# Patient Record
Sex: FEMALE | Race: WHITE | NOT HISPANIC OR LATINO | Employment: OTHER | URBAN - METROPOLITAN AREA
[De-identification: names, ages, dates, MRNs, and addresses within clinical notes are randomized per-mention and may not be internally consistent; named-entity substitution may affect disease eponyms.]

---

## 2017-11-08 ENCOUNTER — ALLSCRIPTS OFFICE VISIT (OUTPATIENT)
Dept: OTHER | Facility: OTHER | Age: 62
End: 2017-11-08

## 2017-11-08 DIAGNOSIS — Z01.419 ENCOUNTER FOR GYNECOLOGICAL EXAMINATION WITHOUT ABNORMAL FINDING: ICD-10-CM

## 2017-11-08 PROCEDURE — 87624 HPV HI-RISK TYP POOLED RSLT: CPT | Performed by: OBSTETRICS & GYNECOLOGY

## 2017-11-08 PROCEDURE — G0145 SCR C/V CYTO,THINLAYER,RESCR: HCPCS | Performed by: OBSTETRICS & GYNECOLOGY

## 2017-11-09 ENCOUNTER — LAB REQUISITION (OUTPATIENT)
Dept: LAB | Facility: HOSPITAL | Age: 62
End: 2017-11-09
Payer: COMMERCIAL

## 2017-11-09 DIAGNOSIS — Z01.419 ENCOUNTER FOR GYNECOLOGICAL EXAMINATION WITHOUT ABNORMAL FINDING: ICD-10-CM

## 2017-11-09 NOTE — PROGRESS NOTES
Assessment    1  Atrophy of vagina (627 3) (N95 2)   2  Dense breast (793 82) (R92 2)   3  Leiomyoma of uterus (218 9) (D25 9)   4  Encounter for routine gynecological examination with Papanicolaou smear of cervix (V72 31,V76 2) (Z01 419)   5  Encounter for screening mammogram for malignant neoplasm of breast (V76 12) (Z12 31)    Plan  Encounter for routine gynecological examination with Papanicolaou smear of cervix    · Follow-up visit in 1 year Evaluation and Treatment  Follow-up  Status: Hold For -Scheduling  Requested for: [id number]   Ordered;Encounter for routine gynecological examination with Papanicolaou smear of cervix; Ordered By: Noa Jean Performed:  Due: [id number]  Encounter for screening mammogram for malignant neoplasm of breast    · MAMMO SCREENING BILATERAL W 3D & CAD; Status:Hold For - Scheduling; Requestedfor:[id number]; Perform:[facility]; MYS:[id number];HJJZTWT;TJV screening mammogram for malignant neoplasm of breast; Ordered By:Cecy Leung;  Leiomyoma of uterus    · Follow-up visit in 3 weeks Evaluation and Treatment  Follow-up  Status: Hold For -Scheduling  Requested for: 84QJO7597   Ordered;Leiomyoma of uterus; Ordered By: Noa Jean Performed:  Due: 22NLP6801    Discussion/Summary    1  Yearly exam-Pap smear done with HPV testing with patient consent, self-breast awareness reviewed, calcium/vitamin-D recommendations discussed, mammogram request given, colonoscopy as per specialisthistory of of postmenopausal bleeding-patient is status post endometrial biopsy x2, with benign findings  She denies any further postmenopausal bleeding and she will call with any such symptoms  Increased breast density/ Mother with bilateral breast cancer-her mother was diagnosed at age 67/80 respectively with the breast cancers  Given this history, she was previously referred to [facility] for possible evaluation  She has had ABUS done June 2016   She will continue screening with 3D mammogramsUterine myomas-ultrasound demonstrates all myomas are smaller, measuring 1 5, 1 2, and 1 2  She is aware that this is likely related to hormonal withdrawal with menopause  She inquired about continuing yearly ultrasound given intermittent pelvic twinges  She will return in the next few weeks for ultrasound and office visit with me  History of hairy cell leukemia/Neutropenia-the patient will followup with hematology, Dr Isra Payton oncologist at [facility]  She stopped Celexa previously secondary to worsening neutropenia  She will continue treatment as per specialistIntermittent pelvic discomfortâ still notes occasionally  She will return for ultrasound in the near futureAtrophic vagina-patient does use lubrication with adequate results  She will call or return with worsening symptoms  Colon cancer screeningâpatient is due for colonoShe will follow-up in the next few weeks for ultrasound and office visit with me  Otherwise, follow-up 1 year for yearly exam or as needed  The patient has the current Goals: Reviewed  The patent has the current Barriers: None  Patient is able to Self-Care  PATIENT EDUCATION RECORD She was given the following educational materials: Calcium/vitamin-D sheet, vaginal lubrication sheet   Chief Complaint    1  Visit For: Preventive General Multisystem Exam    History of Present Illness  HPI: The patient was seen today for yearly exam  Please see assessment plan for details  Review of Systems   Constitutional: No fever, no chills, feels well, no tiredness, no recent weight gain or loss  ENT: no ear ache, no loss of hearing, no nosebleeds or nasal discharge, no sore throat or hoarseness  Cardiovascular: no complaints of slow or fast heart rate, no chest pain, no palpitations, no leg claudication or lower extremity edema  Respiratory: no complaints of shortness of breath, no wheezing, no dyspnea on exertion, no orthopnea or PND    Breasts: no complaints of breast pain, breast lump or nipple discharge  Gastrointestinal: no complaints of abdominal pain, no constipation, no nausea or diarrhea, no vomiting, no bloody stools  Genitourinary: no complaints of dysuria, no incontinence, no pelvic pain, no dysmenorrhea, no vaginal discharge or abnormal vaginal bleeding  Musculoskeletal: no complaints of arthralgia, no myalgia, no joint swelling or stiffness, no limb pain or swelling  Integumentary: no complaints of skin rash or lesion, no itching or dry skin, no skin wounds  Neurological: no complaints of headache, no confusion, no numbness or tingling, no dizziness or fainting  ROS reviewed  Active Problems    1  Anxiety disorder (300 00) (F41 9)   2  Atrophy of vagina (627 3) (N95 2)   3  Dense breast (793 82) (R92 2)   4  Encounter for routine gynecological examination (V72 31) (Z01 419)   5  Encounter for screening mammogram for malignant neoplasm of breast (V76 12) (Z12 31)   6  Hairy cell leukemia (202 40) (C91 40)   7  History of self breast exam   8  Leiomyoma of uterus (218 9) (D25 9)   9  Leukopenia (288 50) (D72 819)    Past Medical History   · History of Basal cell carcinoma of left thigh (173 71) (C44 719)   · History of Endometriosis (617 9) (N80 9)   · History of Female pelvic pain (625 9) (R10 2)   · History of  1 (V22 0) (Z34 00)   · History of abdominal pain (V13 89) (X07 597)   · History of miscarriage (V13 29) (Z87 59)   · History of uterine leiomyoma (V13 29) (Z86 018)   · History of Ovarian cyst (620 2) (N83 20)   · History of Post-menopausal bleeding (627 1) (N95 0)    The active problems and past medical history were reviewed and updated today  Surgical History     · History of Dilation And Curettage   · History of Hysteroscopy   · History of Laparoscopic Excision Left Ovary Cyst (___ Cm)   · History of Laparoscopic Excision Right Ovary Cyst (___ Cm)    The surgical history was reviewed and updated today         Family History  Mother    · Family history of Breast Cancer (V16 3)  Father    · Family history of Coronary artery disease  Maternal Grandfather    · Family history of malignant neoplasm of stomach (V16 0) (Z80 0)  Paternal Aunt    · Family history of malignant neoplasm of stomach (V16 0) (Z80 0)  Maternal Uncle    · Family history of malignant neoplasm of prostate (I00 25) (Z80 45)    The family history was reviewed and updated today  Social History     · Being A Social Drinker   · Caffeine Use   · Denied: History of    · Marital History - Currently    · Never A Smoker   · No drug use   · [employer]   · Uses Safety Equipment - Seatbelts  The social history was reviewed and updated today  The social history was reviewed and is unchanged  Current Meds   1  Allegra 30 MG TABS; Therapy: (Recorded:2016) to Recorded   2  Lexapro 10 MG Oral Tablet; Therapy: (Recorded:2016) to Recorded   3  Vitamin D 78506 UNIT CAPS; Therapy: (Recorded:2016) to Recorded    Allergies  1  No Known Drug Allergies    Vitals   Recorded: 67NWU0811 74:88YY   Systolic 216, LUE, Sitting   Diastolic 86, LUE, Sitting   Height 5 ft 2 in   Weight 145 lb 8 oz   BMI Calculated 26 61   BSA Calculated 1 67   LMP POSTMENOPAUSAL       Physical Exam   Constitutional  General appearance: No acute distress, well appearing and well nourished  Neck  Neck: Normal, supple, trachea midline, no masses  Thyroid: Normal, no thyromegaly  Genitourinary  External genitalia: Normal and no lesions appreciated  Vagina: Normal, no lesions or dryness appreciated  -- Slightly atrophic, without lesions or discharge  Urethra: Normal    Urethral meatus: Normal    Bladder: Normal, soft, non-tender and no prolapse or masses appreciated  Cervix: Normal, no palpable masses  -- Mildly atrophic, without lesions or discharge or cervicitis  No Cervical motion tenderness    Uterus: Normal, non-tender, not enlarged, and no palpable masses  -- Mid position, normal size, nontender, without mass  Adnexa/parametria: Normal, non-tender and no fullness or masses appreciated  Anus, perineum, and rectum: Normal sphincter tone, no masses, and no prolapse  Chest  Breasts: Normal and no dimpling or skin changes noted  Abdomen  Abdomen: Normal, non-tender, and no organomegaly noted  Liver and spleen: No hepatomegaly or splenomegaly  Examination for hernias: No hernias appreciated  Lymphatic  Palpation of lymph nodes in neck, axillae, groin and/or other locations: No lymphadenopathy or masses noted  Skin  Skin and subcutaneous tissue: Normal skin turgor and no rashes     Palpation of skin and subcutaneous tissue: Normal    Psychiatric  Orientation to person, place, and time: Normal    Mood and affect: Normal        Signatures   Electronically signed by : SURINDER Bryant ; Nov 8 2017  9:01AM EST                       (Author)

## 2017-11-13 LAB — HPV RRNA GENITAL QL NAA+PROBE: NORMAL

## 2017-11-15 LAB
LAB AP GYN PRIMARY INTERPRETATION: NORMAL
Lab: NORMAL

## 2017-11-16 ENCOUNTER — GENERIC CONVERSION - ENCOUNTER (OUTPATIENT)
Dept: OTHER | Facility: OTHER | Age: 62
End: 2017-11-16

## 2017-11-28 ENCOUNTER — GENERIC CONVERSION - ENCOUNTER (OUTPATIENT)
Dept: OTHER | Facility: OTHER | Age: 62
End: 2017-11-28

## 2017-12-11 ENCOUNTER — GENERIC CONVERSION - ENCOUNTER (OUTPATIENT)
Dept: OBGYN CLINIC | Facility: CLINIC | Age: 62
End: 2017-12-11

## 2017-12-14 ENCOUNTER — GENERIC CONVERSION - ENCOUNTER (OUTPATIENT)
Dept: OTHER | Facility: OTHER | Age: 62
End: 2017-12-14

## 2017-12-18 ENCOUNTER — GENERIC CONVERSION - ENCOUNTER (OUTPATIENT)
Dept: OTHER | Facility: OTHER | Age: 62
End: 2017-12-18

## 2017-12-18 DIAGNOSIS — R92.2 INCONCLUSIVE MAMMOGRAM: ICD-10-CM

## 2018-01-11 NOTE — MISCELLANEOUS
Message   Recorded as Task   Date: 12/08/2016 07:55 AM, Created By: Saunders Boeck   Task Name: Go to Note   Assigned To: Jose Leung   Regarding Patient: Adrienne Little, Status: Active   CommentConception Ryan - 08 Dec 2016 7:55 AM     TASK CREATED  3-D mammogram with increased breast density  Could consider automated breast ultrasound soon and/or repeat 3-D mammogram next year   RlWilliama - 08 Dec 2016 10:30 AM     TASK REPLIED TO: Previously Assigned To [address] [place] [address]  letter and script sent to pt        Active Problems    1  Abdominal pain (789 00) (R10 9)   2  Anxiety disorder (300 00) (F41 9)   3  Atrophy of vagina (627 3) (N95 2)   4  Dense breast (793 82) (R92 2)   5  Encounter for routine gynecological examination (V72 31) (Z01 419)   6  Encounter for screening mammogram for malignant neoplasm of breast (V76 12)   (Z12 31)   7  Hairy cell leukemia (202 40) (C91 40)   8  History of self breast exam   9  Leiomyoma of uterus (218 9) (D25 9)   10  Leukopenia (288 50) (D72 819)   11  Post-menopausal bleeding (627 1) (N95 0)    Current Meds   1  Allegra 30 MG TABS (Fexofenadine HCl); Therapy: (Recorded:07Nov2016) to Recorded   2  Lexapro 10 MG Oral Tablet (Escitalopram Oxalate); Therapy: (Recorded:07Nov2016) to Recorded   3  Vitamin D 61254 UNIT CAPS; Therapy: (Recorded:07Nov2016) to Recorded    Allergies    1  No Known Drug Allergies    Plan  Dense breast    · US BREAST SCREENING BILATERAL COMPLETE (ABUS); Status:Hold For -  [employer];  Requested for:02Vci2136;     Signatures   Electronically signed by : Oni Pal, ; Dec  8 2016 10:30AM EST                       (Author)

## 2018-01-14 VITALS
WEIGHT: 145.5 LBS | DIASTOLIC BLOOD PRESSURE: 86 MMHG | BODY MASS INDEX: 26.78 KG/M2 | HEIGHT: 62 IN | SYSTOLIC BLOOD PRESSURE: 142 MMHG

## 2018-01-15 NOTE — MISCELLANEOUS
Message   Date: 24 Jun 2016 10:45 AM EST, Recorded By: Phineas Boast For: Reema Gonzáles   Caller: Danie Franco, Self   Phone: [phone] (Home)   ABUS precerted-- no auth needed per [place] at [phone]  Ruth Sole Active Problems    1  Abdominal pain (789 00) (R10 9)   2  Anxiety disorder (300 00) (F41 9)   3  Atrophy of vagina (627 3) (N95 2)   4  Breast self examination education, encounter for (V65 49) (Z71 89)   5  Cervical cancer screening (V76 2) (Z12 4)   6  Dense breast (793 82) (R92 2)   7  Encounter for gynecological examination (V72 31) (Z01 419)   8  Encounter for screening for malignant neoplasm of cervix (V76 2) (Z12 4)   9  Encounter for screening mammogram for malignant neoplasm of breast (V76 12)   (Z12 31)   10  History of self breast exam   11  Leiomyoma of uterus (218 9) (D25 9)   12  Leukopenia (288 50) (D72 819)   13  Post-menopausal bleeding (627 1) (N95 0)   14  Screening for human papillomavirus (HPV) (V73 81) (Z11 51)    Current Meds   1  CeleXA 20 MG Oral Tablet (Citalopram Hydrobromide); Therapy: [id number] to Recorded    Allergies    1   No Known Drug Allergies    Signatures   Electronically signed by : Ron Ferguson, ; Jun 24 2016 10:47AM EST                       (Author)

## 2018-01-16 NOTE — MISCELLANEOUS
Message   Date: 23 Jun 2016 10:27 AM EST, Recorded By: Dominga Loernz For: Garrison Thomas: Loralee Curling, Self   Phone: [phone] (Home)   Reason: Other   Patient called with questions about ABUS  Answered all questions  Pt will call [facility] to schedule  Active Problems    1  Abdominal pain (789 00) (R10 9)   2  Anxiety disorder (300 00) (F41 9)   3  Atrophy of vagina (627 3) (N95 2)   4  Breast self examination education, encounter for (V65 49) (Z71 89)   5  Cervical cancer screening (V76 2) (Z12 4)   6  Dense breast (793 82) (R92 2)   7  Encounter for gynecological examination (V72 31) (Z01 419)   8  Encounter for screening for malignant neoplasm of cervix (V76 2) (Z12 4)   9  Encounter for screening mammogram for malignant neoplasm of breast (V76 12)   (Z12 31)   10  History of self breast exam   11  Leiomyoma of uterus (218 9) (D25 9)   12  Leukopenia (288 50) (D72 819)   13  Post-menopausal bleeding (627 1) (N95 0)   14  Screening for human papillomavirus (HPV) (V73 81) (Z11 51)    Current Meds   1  CeleXA 20 MG Oral Tablet (Citalopram Hydrobromide); Therapy: [id number] to Recorded    Allergies    1   No Known Drug Allergies    Signatures   Electronically signed by : Kenyon Felder, ; Jun 23 2016 10:28AM EST                       (Author)

## 2018-01-17 NOTE — MISCELLANEOUS
Message   Recorded as Task   Date: 11/16/2017 11:33 AM, Created By: Gabrielle Romero   Task Name: Miscellaneous   Assigned To: Yayo Enciso   Regarding Patient: Aubrey Sunshine, Status: Active   CommentDelphine  - 16 Nov 2017 11:33 AM     TASK CREATED  Pap and HPV normal   Patricia Shine - 16 Nov 2017 12:57 PM     TASK EDITED  normal card mailed        Active Problems    1  Anxiety disorder (300 00) (F41 9)   2  Atrophy of vagina (627 3) (N95 2)   3  Dense breast (793 82) (R92 2)   4  Encounter for routine gynecological examination (V72 31) (Z01 419)   5  Encounter for routine gynecological examination with Papanicolaou smear of cervix   (V72 31,V76 2) (Z01 419)   6  Encounter for screening mammogram for malignant neoplasm of breast (V76 12)   (Z12 31)   7  Hairy cell leukemia (202 40) (C91 40)   8  History of self breast exam   9  Leiomyoma of uterus (218 9) (D25 9)   10  Leukopenia (288 50) (D72 819)    Current Meds   1  Allegra 30 MG TABS (Fexofenadine HCl); Therapy: (Recorded:07Nov2016) to Recorded   2  Lexapro 10 MG Oral Tablet (Escitalopram Oxalate); Therapy: (Recorded:07Nov2016) to Recorded   3  Vitamin D 49177 UNIT CAPS; Therapy: (Recorded:07Nov2016) to Recorded    Allergies    1   No Known Drug Allergies    Signatures   Electronically signed by : Khai Cabrera, ; Nov 16 2017 12:58PM EST                       (Author)

## 2018-01-22 VITALS
DIASTOLIC BLOOD PRESSURE: 88 MMHG | HEIGHT: 62 IN | SYSTOLIC BLOOD PRESSURE: 140 MMHG | BODY MASS INDEX: 26.68 KG/M2 | WEIGHT: 145 LBS

## 2018-01-23 NOTE — MISCELLANEOUS
Message   Recorded as Task   Date: 12/14/2017 04:38 PM, Created By: Monisha Case   Task Name: Miscellaneous   Assigned To: Yasmani Rojas   Regarding Patient: Donaldo Bojorquez, Status: Active   CommentRomi Kumar - 14 Dec 2017 4:38 PM     TASK CREATED  Mammogram within normal limits with increased breast density noted  Could consider automated breast ultrasound soon and/or 3D mammogram in 1 year's time  Patricia Shine - 18 Dec 2017 8:19 AM     TASK EDITED  letter and script mailed to pt        Active Problems    1  Anxiety disorder (300 00) (F41 9)   2  Atrophy of vagina (627 3) (N95 2)   3  Dense breast (793 82) (R92 2)   4  Encounter for routine gynecological examination (V72 31) (Z01 419)   5  Encounter for routine gynecological examination with Papanicolaou smear of cervix   (V72 31,V76 2) (Z01 419)   6  Encounter for screening mammogram for malignant neoplasm of breast (V76 12)   (Z12 31)   7  Female pelvic pain (625 9) (R10 2)   8  Hairy cell leukemia (202 40) (C91 40)   9  History of self breast exam   10  Leiomyoma of uterus (218 9) (D25 9)   11  Leukopenia (288 50) (D72 819)    Current Meds   1  Allegra 30 MG TABS (Fexofenadine HCl); Therapy: (Recorded:07Nov2016) to Recorded   2  Lexapro 10 MG Oral Tablet (Escitalopram Oxalate); Therapy: (Recorded:07Nov2016) to Recorded   3  Vitamin D 68049 UNIT CAPS; Therapy: (Recorded:07Nov2016) to Recorded    Allergies    1  No Known Drug Allergies    Plan  Dense breast    · US BREAST SCREENING BILATERAL COMPLETE (ABUS); Status:Hold For -  [facility];  Requested for:77Tht9672;     Signatures   Electronically signed by : Phi Champion, ; Dec 18 2017  8:20AM EST                       (Author)

## 2018-11-09 ENCOUNTER — ANNUAL EXAM (OUTPATIENT)
Dept: OBGYN CLINIC | Facility: CLINIC | Age: 63
End: 2018-11-09
Payer: COMMERCIAL

## 2018-11-09 VITALS
DIASTOLIC BLOOD PRESSURE: 90 MMHG | HEIGHT: 62 IN | SYSTOLIC BLOOD PRESSURE: 140 MMHG | WEIGHT: 150.6 LBS | BODY MASS INDEX: 27.71 KG/M2

## 2018-11-09 DIAGNOSIS — Z12.31 VISIT FOR SCREENING MAMMOGRAM: ICD-10-CM

## 2018-11-09 DIAGNOSIS — D25.9 UTERINE LEIOMYOMA, UNSPECIFIED LOCATION: ICD-10-CM

## 2018-11-09 DIAGNOSIS — N95.2 ATROPHY OF VAGINA: Primary | ICD-10-CM

## 2018-11-09 DIAGNOSIS — R92.2 DENSE BREAST: ICD-10-CM

## 2018-11-09 DIAGNOSIS — R10.2 FEMALE PELVIC PAIN: ICD-10-CM

## 2018-11-09 DIAGNOSIS — Z01.419 WOMEN'S ANNUAL ROUTINE GYNECOLOGICAL EXAMINATION: ICD-10-CM

## 2018-11-09 PROCEDURE — 99396 PREV VISIT EST AGE 40-64: CPT | Performed by: OBSTETRICS & GYNECOLOGY

## 2018-11-09 RX ORDER — KETOCONAZOLE 20 MG/G
CREAM TOPICAL 2 TIMES DAILY PRN
COMMUNITY

## 2018-11-09 RX ORDER — CHOLECALCIFEROL (VITAMIN D3) 1250 MCG
4000 CAPSULE ORAL
COMMUNITY
End: 2021-01-04 | Stop reason: ALTCHOICE

## 2018-11-09 RX ORDER — SULFACETAMIDE SODIUM, SULFUR 98; 48 MG/G; MG/G
LIQUID TOPICAL
COMMUNITY
End: 2021-01-04 | Stop reason: ALTCHOICE

## 2018-11-09 RX ORDER — ESCITALOPRAM OXALATE 10 MG/1
TABLET ORAL
COMMUNITY

## 2018-11-09 RX ORDER — HYDROCORTISONE 25 MG/ML
LOTION TOPICAL
COMMUNITY
End: 2020-10-02

## 2018-11-09 RX ORDER — FLUTICASONE PROPIONATE 50 MCG
2 SPRAY, SUSPENSION (ML) NASAL
COMMUNITY
Start: 2018-07-02

## 2018-11-09 NOTE — PATIENT INSTRUCTIONS
Vaginal Atrophy   WHAT YOU NEED TO KNOW:   What is vaginal atrophy? Vaginal atrophy is a condition that causes thinning, drying, and inflammation of vaginal tissue  This condition is caused by decreased levels of estrogen (a female sex hormone)  Vaginal atrophy can increase your risk for vaginal and urinary tract infections  Vaginal atrophy can worsen over time if not treated  What causes or increases your risk of vaginal atrophy? · Menopause     · Medicines that lower your estrogen levels, such as those used to treat breast cancer, endometriosis, or fibroids    · Radiation to your pelvic area     · Surgery to remove the ovaries    · Breastfeeding  What are the signs and symptoms of vaginal atrophy? · Vaginal dryness, itching, and burning    · Vaginal discharge    · Pain or discomfort during sex    · Light bleeding after sex    · Burning during urination    · Frequent, sudden, strong urges to urinate    · Urinary incontinence (loss of control of your bladder)  How is vaginal atrophy diagnosed? Your healthcare provider will ask about your symptoms  A pelvic exam will be done to examine your vagina and cervix  Your healthcare provider will place a speculum into your vagina to open and examine it  A sample of discharge from your vagina may be collected and tested  A urine test may also be done  How is vaginal atrophy treated? · Over-the counter vaginal moisturizers  can help reduce dryness  Your healthcare provider may recommend that you use a vaginal moisturizer several times each week and during sex  Only use creams that are made for vaginal use  Do  not  use petroleum jelly  Lubricants can be used during sex to decrease pain and discomfort  · Estrogen  may help decrease dryness  It may also lower your risk of vaginal infections if you are going through menopause  It can also help to relieve urinary symptoms  Estrogen may be prescribed in the form of a cream, tablet, or ring   These medicines can be applied or inserted into the vagina  Estrogen can also be prescribed in the form of a pill  When should I contact my healthcare provider? · You have a foul-smelling odor coming from your vagina  · You have a thick, cheese-like discharge from your vagina  · You have itching, swelling, or redness in your vagina  · You have pain or burning when you urinate  · Your urine smells bad  · Your symptoms do not improve, or they get worse  · You have questions or concerns about your condition or care  CARE AGREEMENT:   You have the right to help plan your care  Learn about your health condition and how it may be treated  Discuss treatment options with your caregivers to decide what care you want to receive  You always have the right to refuse treatment  The above information is an  only  It is not intended as medical advice for individual conditions or treatments  Talk to your doctor, nurse or pharmacist before following any medical regimen to see if it is safe and effective for you  © 2017 [employer] Information is for End User's use only and may not be sold, redistributed or otherwise used for commercial purposes  All illustrations and images included in CareNotes® are the copyrighted property of A D A M , Inc  or Charles Jimenez

## 2018-11-09 NOTE — PROGRESS NOTES
Assessment/Plan:  1  Yearly exam-Pap smear deferred, self-breast awareness, calcium/vitamin-D recommendations discussed, mammogram request given, colonoscopy as per specialist   2  Intermittent pelvic discomfort-patient notes rarely  Exam was unremarkable for focal findings  Given the discomfort, she will return in the near future for ultrasound and office visit with me  Of note, she did have years of fertility treatments in the past with minimal use of OCP  3  Prior history of postmenopausal bleeding-no current symptoms  4  Increased breast density-patient with 3D mammogram 12/11/17 with normal findings with increased density  She last had ABUS in June 2016 which was unremarkable  She may consider ABUS going forward  5  Uterine myomas-most recent ultrasound November 2017 with 1 cm, 0 9 cm, and 1 1 cm myomas all slightly decreased from prior ultrasound  We will assess at upcoming ultrasound  6  Vaginal atrophy-moderate on exam today  Patient denies any significant dyspareunia  She was given the vaginal lubrication sheet she will call with any issues  She was briefly counseled about vaginal estrogen, Osphena, and vaginal DHEA-S   7  Elevated blood pressure-140/90 today  Repeat was done  Patient states that her blood pressure was being followed closely by her primary doctor and she may need medications  Suggested she follow up with them in the near future  She will follow-up in the near future for ultrasound and office visit with me  Otherwise, follow-up 1 year for yearly exam or as needed  No problem-specific Assessment & Plan notes found for this encounter         Diagnoses and all orders for this visit:    Atrophy of vagina    Uterine leiomyoma, unspecified location    Dense breast    Female pelvic pain    Women's annual routine gynecological examination    Visit for screening mammogram  -     Mammo screening bilateral w 3d & cad; Future    Other orders  -     fluticasone (FLONASE) 50 mcg/act nasal spray; 2 sprays into each nostril  -     hydrocortisone 2 5 % lotion; Apply topically  -     ketoconazole (NIZORAL) 2 % cream; Apply topically 2 (two) times a day as needed  -     Sulfacetamide Sodium-Sulfur 9 8-4 8 % LIQD; Apply topically  -     triamcinolone (KENALOG) 0 1 % ointment; Reported on 4/24/2017  -     TOBRADEX ophthalmic ointment; APPLY INTO BOTH EYES AT BEDTIME FOR 7 DAYS          Subjective:      Patient ID: Rosa M Lezama is a 65 y o  female  Patient was seen today for yearly exam   Please see assessment plan for details  The following portions of the patient's history were reviewed and updated as appropriate: allergies, current medications, past family history, past medical history, past social history, past surgical history and problem list     Review of Systems   Constitutional: Negative for chills, diaphoresis, fatigue and fever  Respiratory: Negative for apnea, cough, chest tightness, shortness of breath and wheezing  Cardiovascular: Negative for chest pain, palpitations and leg swelling  Gastrointestinal: Negative for abdominal distention, abdominal pain, anal bleeding, constipation, diarrhea, nausea, rectal pain and vomiting  Genitourinary: Negative for difficulty urinating, dyspareunia, dysuria, frequency, hematuria, menstrual problem, pelvic pain, urgency, vaginal bleeding, vaginal discharge and vaginal pain  Musculoskeletal: Negative for arthralgias, back pain and myalgias  Skin: Negative for color change and rash  Neurological: Negative for dizziness, syncope, light-headedness, numbness and headaches  Hematological: Negative for adenopathy  Does not bruise/bleed easily  Psychiatric/Behavioral: Negative for dysphoric mood and sleep disturbance  The patient is not nervous/anxious            Objective:      /90 (BP Location: Left arm, Patient Position: Sitting, Cuff Size: Standard)   Ht 5' 1 5" (1 562 m)   Wt 68 3 kg (150 lb 9 6 oz)   BMI 27 99 kg/m² Physical Exam    Objective      /90 (BP Location: Left arm, Patient Position: Sitting, Cuff Size: Standard)   Ht 5' 1 5" (1 562 m)   Wt 68 3 kg (150 lb 9 6 oz)   BMI 27 99 kg/m²     General:   alert and oriented, in no acute distress, alert, appears stated age and cooperative   Neck: normal to inspection and palpation   Breast: normal appearance, no masses or tenderness   Heart:    Lungs:    Abdomen: soft, non-tender, without masses or organomegaly   Vulva: normal   Vagina: Moderately atrophic, tightly admits 2 fingers, without erythema or lesions or discharge  Cervix: Moderately atrophic, flush to the vagina, without lesions or discharge or cervicitis    No Cervical motion tenderness   Uterus: top normal size, retroverted, non-tender   Adnexa: no mass, fullness, tenderness   Rectum: negative

## 2018-11-26 ENCOUNTER — APPOINTMENT (OUTPATIENT)
Dept: RADIOLOGY | Facility: CLINIC | Age: 63
End: 2018-11-26
Payer: COMMERCIAL

## 2018-11-26 ENCOUNTER — OFFICE VISIT (OUTPATIENT)
Dept: OBGYN CLINIC | Facility: CLINIC | Age: 63
End: 2018-11-26
Payer: COMMERCIAL

## 2018-11-26 ENCOUNTER — ULTRASOUND (OUTPATIENT)
Dept: OBGYN CLINIC | Facility: CLINIC | Age: 63
End: 2018-11-26
Payer: COMMERCIAL

## 2018-11-26 ENCOUNTER — TELEPHONE (OUTPATIENT)
Dept: OBGYN CLINIC | Facility: CLINIC | Age: 63
End: 2018-11-26

## 2018-11-26 VITALS
HEIGHT: 62 IN | BODY MASS INDEX: 27.6 KG/M2 | HEART RATE: 59 BPM | WEIGHT: 150 LBS | DIASTOLIC BLOOD PRESSURE: 91 MMHG | SYSTOLIC BLOOD PRESSURE: 160 MMHG

## 2018-11-26 DIAGNOSIS — M79.671 PAIN IN RIGHT FOOT: ICD-10-CM

## 2018-11-26 DIAGNOSIS — M79.671 PAIN IN RIGHT FOOT: Primary | ICD-10-CM

## 2018-11-26 DIAGNOSIS — G57.61 MORTON'S NEUROMA OF RIGHT FOOT: ICD-10-CM

## 2018-11-26 DIAGNOSIS — M72.2 PLANTAR FASCIITIS, LEFT: ICD-10-CM

## 2018-11-26 DIAGNOSIS — M79.672 PAIN IN LEFT FOOT: ICD-10-CM

## 2018-11-26 DIAGNOSIS — D21.9 FIBROIDS: Primary | ICD-10-CM

## 2018-11-26 PROCEDURE — 73630 X-RAY EXAM OF FOOT: CPT

## 2018-11-26 PROCEDURE — 76830 TRANSVAGINAL US NON-OB: CPT | Performed by: OBSTETRICS & GYNECOLOGY

## 2018-11-26 PROCEDURE — 99243 OFF/OP CNSLTJ NEW/EST LOW 30: CPT | Performed by: ORTHOPAEDIC SURGERY

## 2018-11-26 RX ORDER — DEXAMETHASONE SODIUM PHOSPHATE 4 MG/ML
4 INJECTION, SOLUTION INTRA-ARTICULAR; INTRALESIONAL; INTRAMUSCULAR; INTRAVENOUS; SOFT TISSUE DAILY
Status: SHIPPED | OUTPATIENT
Start: 2018-11-27

## 2018-11-26 NOTE — PROGRESS NOTES
AMB US Pelvic Non OB  Date/Time: 11/26/2018 11:46 AM  Performed by: Jim He  Authorized by: Alex Newton     Uterine findings:     Diameter (mm):  28    Length (mm):  63    Width (mm):  40    Endometrial stripe: identified      Endometrium thickness (mm):  0 8  Left ovary findings:     Diameter (mm):  8 2    Length (mm):  15    Width (mm):  11 1  Right ovary findings:     Diameter (mm):  8 1    Length (mm):  20    Width (mm):  9 2  Post-Procedure Details:     Impression:  Retroverted uterus demonstrates stable fibroids  Largest are anterior fundal 1 0cm, lower uterine segment 0 9cm, and anterior intramural 1 0cm  Bilateral ovaries appear within normal limits  No free fluid  Tolerance: Tolerated well, no immediate complications    Complications: no complications    Additional Procedure Comments:      Siemens Acuson X150 EC9-4 transvaginal transducer Serial # [id number] was used to perform the examination today and subsequently followed with high level disinfection utilizing Trophon EPR procedure

## 2018-11-26 NOTE — PROGRESS NOTES
SURINDER Greenwood  Attending, Orthopaedic Surgery  Foot and [address] [facility] [address] [facility]      ORTHOPAEDIC FOOT AND ANKLE CLINIC VISIT       Assessment:     Encounter Diagnoses   Name Primary?  Pain in right foot Yes    Pain in left foot     Plantar fasciitis, left     Tuttle's neuroma of right foot             Plan:   · The patient verbalized understanding of exam findings and treatment plan  We engaged in the shared decision-making process and treatment options were discussed at length with the patient  · For the left foot, Visco heels, PT with iontophoresis, Night splints, activity modification  · For the right foot- metatarsal pads  Return in about 2 months (around 1/26/2019)  History of Present Illness:   Chief Complaint:   Chief Complaint   Patient presents with   John Chandler is a 65 y o  female who is being seen for bilateral foot pain  She has had a mortons neuroma in the right foot for years which she has treated with metatarsal pads with good relief  She has 2 months of plantar fasciitis in the left foot since her trip to [facility]  Pain is localized at plantar fascia origin with minimal radiating and described as sharp and severe  Patient denies numbness, tingling or radicular pain  Denies history of neuropathy  Patient does not smoke, does not have diabetes and does not take blood thinners  Patient denies family history of anesthesia complications and has not had any complications with anesthesia       Pain/symptom timing:  Worse during the day when active  Pain/symptom context:  Worse with activites and work  Pain/symptom modifying factors:  Rest makes better, activities make worse  Pain/symptom associated signs/symptoms: none    Prior treatment   · NSAIDsYes    · Injections Yes   · Bracing/Orthotics Yes    · Physical Therapy No     Orthopedic Surgical History:   See above    Past Medical History:  Past Medical History: Diagnosis Date    Endometriosis     Female infertility     Fibroid     Recurrent pregnancy loss, antepartum condition or complication     Skin cancer        Past Surgical History:   Procedure Laterality Date    LAPAROSCOPY         The patient has a family history of        Current Outpatient Prescriptions:     Cholecalciferol (VITAMIN D3) 07467 units CAPS, Take 4,000 Units by mouth  , Disp: , Rfl:     escitalopram (LEXAPRO) 10 mg tablet, Take by mouth, Disp: , Rfl:     fexofenadine (ALLEGRA) 30 MG tablet, Take by mouth as needed  , Disp: , Rfl:     fluticasone (FLONASE) 50 mcg/act nasal spray, 2 sprays into each nostril, Disp: , Rfl:     hydrocortisone 2 5 % lotion, Apply topically, Disp: , Rfl:     ketoconazole (NIZORAL) 2 % cream, Apply topically 2 (two) times a day as needed, Disp: , Rfl:     Sulfacetamide Sodium-Sulfur 9 8-4 8 % LIQD, Apply topically, Disp: , Rfl:     TOBRADEX ophthalmic ointment, APPLY INTO BOTH EYES AT BEDTIME FOR 7 DAYS, Disp: , Rfl: 1    triamcinolone (KENALOG) 0 1 % ointment, Reported on 4/24/2017, Disp: , Rfl:       Allergies   Allergen Reactions    Methylisothiazolinone Itching and Rash       Review of Systems:  A comprehensive 14 point ROS was performed, reviewed, and the pertinent orthopaedic findings are documented in the HPI  Physical Exam:   /91 (BP Location: Left arm, Patient Position: Sitting, Cuff Size: Adult)   Pulse 59   Ht 5' 1 5" (1 562 m)   Wt 68 kg (150 lb)   BMI 27 88 kg/m²   General/Constitutional: No apparent distress: well-nourished and well developed  Eyes: normal ocular motion  Lymphatic: No appreciable lymphadenopathy  Respiratory: Non-labored breathing  Vascular: No edema, swelling or tenderness, except as noted in detailed exam   Integumentary: No impressive skin lesions present, except as noted in detailed exam   Neuro: No ataxia or tremors noted  Psych: Normal mood and affect, oriented to person, place and time   Appropriate affect  Musculoskeletal: Normal, except as noted in detailed exam and in HPI  Examination      Right Left   Gait Normal   Musculoskeletal Tender to palpation at 3rd interspace Tender to palpation at plantar fascia origin   Skin Normal    Normal     Nails Normal Normal   Range of Motion  10 degrees dorsiflexion, 30 degrees plantarflexion  Subtalar motion: normal 10 degrees dorsiflexion, 30 degrees plantarflexion  Subtalar motion: normal   Stability Stable Stable   Muscle Strength 5/5 tibialis anterior  5/5 gastrocnemius-soleus  5/5 posterior tibialis  5/5 peroneal/eversion strength  5/5 EHL  5/5 FHL 5/5 tibialis anterior  5/5 gastrocnemius-soleus  5/5 posterior tibialis  5/5 peroneal/eversion strength  5/5 EHL  5/5 FHL   Neurologic Normal Normal   Sensation Intact to light touch throughout sural, saphenous, superficial peroneal, deep peroneal and medial/lateral plantar nerve distributions  [place]-Guido 5 07 filament (10g) testing deferred  Intact to light touch throughout sural, saphenous, superficial peroneal, deep peroneal and medial/lateral plantar nerve distributions  [place]Guido 5 07 filament (10g) testing deferred  Cardiovascular Brisk capillary refill < 2 seconds,intact DP and PT pulses Brisk capillary refill < 2 seconds,intact DP and PT pulses   Special Tests -[place] click None       Imaging Studies:   3 views of the left and right foot were taken, reviewed and interpreted independently that demonstrate no obvious bony abnormality  Reviewed by me personally  Adella Hagedorn Lachman, MD  Attending, [facility] personally performed the service  Adella Hagedorn Lachman, MD

## 2018-11-26 NOTE — TELEPHONE ENCOUNTER
Please call the patient to tell her that her ultrasound is stable, with small fibroids  No suspicious findings were noted  She can follow up in 1 year's time for yearly exam or as needed

## 2018-12-03 RX ORDER — DEXAMETHASONE SODIUM PHOSPHATE 4 MG/ML
4 INJECTION, SOLUTION INTRA-ARTICULAR; INTRALESIONAL; INTRAMUSCULAR; INTRAVENOUS; SOFT TISSUE EVERY 6 HOURS SCHEDULED
Qty: 20 ML | Refills: 0 | Status: SHIPPED | OUTPATIENT
Start: 2018-12-03 | End: 2021-01-04 | Stop reason: ALTCHOICE

## 2018-12-03 NOTE — TELEPHONE ENCOUNTER
I put another dexamethasone order in the computer for her [facility]     The orthotics I recommend are  or the  Memory foam orthotics from [employer]  then click ORTHOTICS then click WOMENS then click CASUAL and it will be the top two choices

## 2018-12-03 NOTE — TELEPHONE ENCOUNTER
Patent states her [facility] did not receive her prescription dexamethasone, to please be sent again to her [facility]   Patient also would like to ask Dr Sander Swanson what brand would be best for her othatic foot wear   She knows it should be soft/arch support but needs the brand name

## 2018-12-04 ENCOUNTER — EVALUATION (OUTPATIENT)
Dept: PHYSICAL THERAPY | Facility: CLINIC | Age: 63
End: 2018-12-04
Payer: COMMERCIAL

## 2018-12-04 DIAGNOSIS — M72.2 PLANTAR FASCIITIS, LEFT: ICD-10-CM

## 2018-12-04 PROCEDURE — 97162 PT EVAL MOD COMPLEX 30 MIN: CPT | Performed by: PHYSICAL THERAPIST

## 2018-12-04 PROCEDURE — 97530 THERAPEUTIC ACTIVITIES: CPT | Performed by: PHYSICAL THERAPIST

## 2018-12-04 NOTE — PROGRESS NOTES
PT Evaluation     Today's date: 2018  Patient name: Jose E Beavers  : 1955  MRN: [id number]  Referring provider: Latoya Espinoza MD  Dx:   Encounter Diagnosis     ICD-10-CM    1  Plantar fasciitis, left M72 2 Ambulatory referral to Physical Therapy                  Assessment  Assessment details: Jose E Beavers is a 65 y o  female present with:   Plantar fasciitis, left    Danii Gonzalez has the above listed impairments and will benefit from skilled PT to improve deficits to return to prior level of function  Impairments: abnormal muscle firing, abnormal or restricted ROM, activity intolerance, impaired physical strength, lacks appropriate home exercise program and pain with function  Understanding of Dx/Px/POC: good   Prognosis: good    Goals  Impairment Goals  - Decrease pain   - Improve ROM to [facility]  - Increase strength     Functional Goals  - Return to Prior Level of Function  - Increase Functional Status Measure  - Patient will be independent with HEP    Plan  Patient would benefit from: skilled PT  Planned therapy interventions: joint mobilization, manual therapy, patient education, postural training, activity modification, abdominal trunk stabilization, body mechanics training, flexibility, functional ROM exercises, graded exercise, home exercise program, neuromuscular re-education, strengthening, stretching, therapeutic activities and therapeutic exercise  Frequency: 2x week  Duration in weeks: 8  Treatment plan discussed with: patient        Subjective Evaluation    History of Present Illness  Mechanism of injury: Pt presents with bilat foot pain  She noticed pain in L foot (arch) after wearing crocks and walking a lot 2 mo  Ago while in [facility]  Symptoms persisted  Symptoms are aggravated by walking and WB activity  She has been using OTC inserts  She reports that she has a neuroma in L foot (chronic symptoms), worsens with WB  She has been using met pads and OTC inserts with some success    She also started using a night splint one week ago and has noticed decreased pain in AM  Typical daily footwear:  Clogs/sneakers  She also c/o intermittent L knee pain (chronic)    Quality of life: good    Pain  Current pain ratin  At worst pain ratin  Quality: burning and sharp  Aggravating factors: standing and walking    Treatments  No previous or current treatments  Patient Goals  Patient goals for therapy: return to sport/leisure activities and decreased pain          Objective  Maintains arch in stance  RCSP 3 deg ev bilat  - windlass  Gait: mod STJ pronation, med calc whip with abducted forefoot  PROM wfl bilat   DF KE 10 deg bilat  TTP L med calc tubercle  Dwain click R + for click but no pain    Precautions: none    Daily Treatment Diary       Manuals             jt mobs             GT L foot/calf                                       Exercise Diary              bike             Short foot             Toe splay             gastroc and soleus stretch                                                                                                                                                                                                                                                       Modalities             ionto x

## 2018-12-05 DIAGNOSIS — Z12.31 ENCOUNTER FOR SCREENING MAMMOGRAM FOR MALIGNANT NEOPLASM OF BREAST: ICD-10-CM

## 2018-12-07 ENCOUNTER — OFFICE VISIT (OUTPATIENT)
Dept: PHYSICAL THERAPY | Facility: CLINIC | Age: 63
End: 2018-12-07
Payer: COMMERCIAL

## 2018-12-07 DIAGNOSIS — M72.2 PLANTAR FASCIITIS, LEFT: Primary | ICD-10-CM

## 2018-12-07 PROCEDURE — 97530 THERAPEUTIC ACTIVITIES: CPT

## 2018-12-07 PROCEDURE — 97140 MANUAL THERAPY 1/> REGIONS: CPT

## 2018-12-07 PROCEDURE — 97033 APP MDLTY 1+IONTPHRSIS EA 15: CPT

## 2018-12-07 NOTE — PROGRESS NOTES
Daily Note     Today's date: 2018  Patient name: Luis Simpson  : 1955  MRN: [id number]  Referring provider: Glen Alfaro MD  Dx:   Encounter Diagnosis     ICD-10-CM    1  Plantar fasciitis, left M72 2                   Subjective: Pt reports her L plantar fascia pain level is 3/10  Noted on Wednesday, she had L heel pain by the end of 3 hours of standing on her feet cooking  Objective: See treatment diary below      Assessment: Initiated exercise program as below, performed same w/o increasing symptoms  Short foot ex was challenging for pt  Restrictions noted during GT to L heel and calf, able to yohana same  Concluded with application of iontophoresis  Verbally instructed pt to perform toe splay ex at home 10 reps, BID  Will monitor  Patient would benefit from continued PT      Plan: Continue per plan of care       Precautions: none     Daily Treatment Diary         Manuals                    jt mobs                       GT L foot/calf                                                                       Exercise Diary                        bike    5'                   Short foot    5"x10                   Toe splay    5"x10                   gastroc and soleus stretch    30"x3  each                                                                                                                                                                                                                                                                                                                                                                                                                                                                   Modalities                       ionto x

## 2018-12-12 ENCOUNTER — OFFICE VISIT (OUTPATIENT)
Dept: PHYSICAL THERAPY | Facility: CLINIC | Age: 63
End: 2018-12-12
Payer: COMMERCIAL

## 2018-12-12 DIAGNOSIS — M72.2 PLANTAR FASCIITIS, LEFT: Primary | ICD-10-CM

## 2018-12-12 PROCEDURE — 97110 THERAPEUTIC EXERCISES: CPT | Performed by: PHYSICAL THERAPIST

## 2018-12-12 PROCEDURE — 97140 MANUAL THERAPY 1/> REGIONS: CPT | Performed by: PHYSICAL THERAPIST

## 2018-12-12 PROCEDURE — 97530 THERAPEUTIC ACTIVITIES: CPT | Performed by: PHYSICAL THERAPIST

## 2018-12-12 NOTE — PROGRESS NOTES
Daily Note     Today's date: 2018  Patient name: Ramos Guido  : 1955  MRN: [id number]  Referring provider: Jemma Serrano MD  Dx:   Encounter Diagnosis     ICD-10-CM    1  Plantar fasciitis, left M72 2                   Subjective: no signif changes reported    Objective: See treatment diary below      Assessment:Patient would benefit from continued PT      Plan: Continue per plan of care       Precautions: none     Daily Treatment Diary         Manuals                  jt mobs: post shaw      3'                 GT L foot/calf    mo  foot                  laser     4'                                         Exercise Diary                        bike    5'  10                 Short foot    5"x10  3'                 Toe splay    5"x10  3'                 gastroc and soleus stretch    30"x3  each  30"x3                                                                                                                                                                                                                                                                                                                                                                                                                                                                 Modalities                       ionto x  x  x

## 2018-12-14 ENCOUNTER — OFFICE VISIT (OUTPATIENT)
Dept: PHYSICAL THERAPY | Facility: CLINIC | Age: 63
End: 2018-12-14
Payer: COMMERCIAL

## 2018-12-14 DIAGNOSIS — Z12.31 VISIT FOR SCREENING MAMMOGRAM: ICD-10-CM

## 2018-12-14 DIAGNOSIS — M72.2 PLANTAR FASCIITIS, LEFT: Primary | ICD-10-CM

## 2018-12-14 PROCEDURE — 97530 THERAPEUTIC ACTIVITIES: CPT

## 2018-12-14 PROCEDURE — 97140 MANUAL THERAPY 1/> REGIONS: CPT

## 2018-12-14 PROCEDURE — 97110 THERAPEUTIC EXERCISES: CPT

## 2018-12-14 NOTE — PROGRESS NOTES
Daily Note     Today's date: 2018  Patient name: Gregorio Zuniga  : 1955  MRN: [id number]  Referring provider: Namita Pryor MD  Dx:   Encounter Diagnosis     ICD-10-CM    1  Plantar fasciitis, left M72 2      1:1 with PTA CR 9:20- 10:20  Subjective: Reports feeling pretty good since last session  Objective: See treatment diary below      Assessment: Patient would benefit from continued PT  Continues with hypertonicity and mod TTP medial gastroc  IONTO patches unavailable- resume NV  Plan: Continue per plan of care       Precautions: none     Daily Treatment Diary         Manuals                jt mobs: post shaw      3'  PT,MB  3 mins               GT L foot/calf    mo  foot  10 mins                laser     4'  4 mins                                       Exercise Diary                        bike    5'  10  10 mins               Short foot    5"x10  3'  3 mins               Toe splay    5"x10  3'  3 mins               gastroc and soleus stretch    30"x3  each  30"x3  30"x3 ea                                                                                                                                                                                                                                                                                                                                                                                                                                                                Modalities                       ionto x  x  x  performed

## 2018-12-18 ENCOUNTER — OFFICE VISIT (OUTPATIENT)
Dept: PHYSICAL THERAPY | Facility: CLINIC | Age: 63
End: 2018-12-18
Payer: COMMERCIAL

## 2018-12-18 ENCOUNTER — TELEPHONE (OUTPATIENT)
Dept: OBGYN CLINIC | Facility: CLINIC | Age: 63
End: 2018-12-18

## 2018-12-18 DIAGNOSIS — R92.2 DENSE BREAST TISSUE: Primary | ICD-10-CM

## 2018-12-18 DIAGNOSIS — M72.2 PLANTAR FASCIITIS, LEFT: Primary | ICD-10-CM

## 2018-12-18 PROCEDURE — 97140 MANUAL THERAPY 1/> REGIONS: CPT | Performed by: PHYSICAL THERAPIST

## 2018-12-18 PROCEDURE — 97110 THERAPEUTIC EXERCISES: CPT | Performed by: PHYSICAL THERAPIST

## 2018-12-18 PROCEDURE — 97530 THERAPEUTIC ACTIVITIES: CPT | Performed by: PHYSICAL THERAPIST

## 2018-12-18 NOTE — TELEPHONE ENCOUNTER
----- Message from Thalia Holm MD sent at 12/14/2018  5:35 PM EST -----  3D Mammogram within normal limits with increased breast density noted  Could consider automated breast ultrasound soon and/or 3D mammogram in 1 year's time

## 2018-12-18 NOTE — PROGRESS NOTES
Daily Note     Today's date: 2018  Patient name: Ailyn Lopez  : 1955  MRN: [id number]  Referring provider: Jennifer Rodríguez MD  Dx:   Encounter Diagnosis     ICD-10-CM    1  Plantar fasciitis, left M72 2      1:1 with PTA CR 9:20- 10:20  Subjective: Reports that she was on her feet for about 7 hrs straight 2 days ago and felt ok  The next day she went shopping at the mall and noticed increased pain that lingered the rest of the day  Pain has improved slightly since  Objective: See treatment diary below      Assessment: Patient would benefit from continued PT  Plan: Continue per plan of care       Precautions: none     Daily Treatment Diary         Manuals              jt mobs: post shaw      3'  PT,MB  3 mins  4'             GT L foot/calf    mo  foot  10 mins  6', TPM to calf              laser     4'  4 mins  4'                                     Exercise Diary                        bike    5'  10  10 mins  10             Short foot    5"x10  3'  3 mins  3'             Toe splay    5"x10  3'  3 mins  3'             gastroc and soleus stretch    30"x3  each  30"x3  30"x3 ea   30"x3                                                                                                                                                                                                                                                                                                                                                                                                                                                             Modalities                       ionto x  x  x  performed  np

## 2018-12-20 ENCOUNTER — OFFICE VISIT (OUTPATIENT)
Dept: PHYSICAL THERAPY | Facility: CLINIC | Age: 63
End: 2018-12-20
Payer: COMMERCIAL

## 2018-12-20 DIAGNOSIS — M72.2 PLANTAR FASCIITIS, LEFT: Primary | ICD-10-CM

## 2018-12-20 PROCEDURE — 97140 MANUAL THERAPY 1/> REGIONS: CPT | Performed by: PHYSICAL THERAPIST

## 2018-12-20 PROCEDURE — 97110 THERAPEUTIC EXERCISES: CPT | Performed by: PHYSICAL THERAPIST

## 2018-12-20 PROCEDURE — 97530 THERAPEUTIC ACTIVITIES: CPT | Performed by: PHYSICAL THERAPIST

## 2018-12-20 PROCEDURE — G8979 MOBILITY GOAL STATUS: HCPCS | Performed by: PHYSICAL THERAPIST

## 2018-12-20 PROCEDURE — G8978 MOBILITY CURRENT STATUS: HCPCS | Performed by: PHYSICAL THERAPIST

## 2018-12-20 NOTE — PROGRESS NOTES
Daily Note     Today's date: 2018  Patient name: Gregorio Zuniga  : 1955  MRN: [id number]  Referring provider: Namita Pryor MD  Dx:   Encounter Diagnosis     ICD-10-CM    1  Plantar fasciitis, left M72 2      1:1 with PTA CR 9:20- 10:20  Subjective: Reports that she was on her feet for about 7 hrs straight 2 days ago and felt ok  The next day she went shopping at the mall and noticed increased pain that lingered the rest of the day  Pain has improved slightly since  Objective: See treatment diary below      Assessment: Patient would benefit from continued PT  Plan: Continue per plan of care       Precautions: none     Daily Treatment Diary         Manuals            jt mobs: post shaw      3'  PT,MB  3 mins  4'  4'           GT L foot/calf    mo  foot  10 mins  6', TPM to calf  foot 4'            laser     4'  4 mins  4'  4'                                   Exercise Diary                        bike    5'  10  10 mins  10  10           Short foot    5"x10  3'  3 mins  3'  3'           Toe splay    5"x10  3'  3 mins  3'  3'           gastroc and soleus stretch    30"x3  each  30"x3  30"x3 ea   30"x3  30"x3                                                                                                                                                                                                                                                                                                                                                                                                                                                           Modalities                       ionto x  x  x  performed  np  x

## 2018-12-31 ENCOUNTER — OFFICE VISIT (OUTPATIENT)
Dept: PHYSICAL THERAPY | Facility: CLINIC | Age: 63
End: 2018-12-31
Payer: COMMERCIAL

## 2018-12-31 DIAGNOSIS — M72.2 PLANTAR FASCIITIS, LEFT: Primary | ICD-10-CM

## 2018-12-31 PROCEDURE — 97530 THERAPEUTIC ACTIVITIES: CPT | Performed by: PHYSICAL THERAPIST

## 2018-12-31 PROCEDURE — 97140 MANUAL THERAPY 1/> REGIONS: CPT | Performed by: PHYSICAL THERAPIST

## 2018-12-31 PROCEDURE — 97110 THERAPEUTIC EXERCISES: CPT | Performed by: PHYSICAL THERAPIST

## 2018-12-31 NOTE — PROGRESS NOTES
Daily Note     Today's date: 2018  Patient name: Claudy Gomez  : 1955  MRN: [id number]  Referring provider: Esther Chairez MD  Dx:   Encounter Diagnosis     ICD-10-CM    1  Plantar fasciitis, left M72 2      Start Time:   Stop Time: 1030  Total time in clinic (min): 45 minutes    Subjective: Reports that she did a lot of walking over the weekend and did not experience much increase in pain  Objective: See treatment diary below      Assessment: Patient would benefit from continued PT  Plan: Continue per plan of care       Precautions: none     Daily Treatment Diary         Manuals          jt mobs: post shaw      3'  PT,MB  3 mins  4'  4'  2'         GT L foot/calf    mo  foot  10 mins  6', TPM to calf  foot 4'  4'          laser     4'  4 mins  4'  4'  4'                                 Exercise Diary                        bike    5'  10  10 mins  10  10  10'         Short foot    5"x10  3'  3 mins  3'  3'  3'         Toe splay    5"x10  3'  3 mins  3'  3'  3'         gastroc and soleus stretch    30"x3  each  30"x3  30"x3 ea   30"x3  30"x3  30"x3 ea          trial of heel cup             x                                                                                                                                                                                                                                                                                                                                                                                                                                 Modalities                       ionto x  x  x  performed  np  x

## 2019-01-03 ENCOUNTER — OFFICE VISIT (OUTPATIENT)
Dept: PHYSICAL THERAPY | Facility: CLINIC | Age: 64
End: 2019-01-03
Payer: COMMERCIAL

## 2019-01-03 DIAGNOSIS — M72.2 PLANTAR FASCIITIS, LEFT: Primary | ICD-10-CM

## 2019-01-03 PROCEDURE — 97530 THERAPEUTIC ACTIVITIES: CPT | Performed by: PHYSICAL THERAPIST

## 2019-01-03 PROCEDURE — 97140 MANUAL THERAPY 1/> REGIONS: CPT | Performed by: PHYSICAL THERAPIST

## 2019-01-03 PROCEDURE — G8979 MOBILITY GOAL STATUS: HCPCS | Performed by: PHYSICAL THERAPIST

## 2019-01-03 PROCEDURE — 97110 THERAPEUTIC EXERCISES: CPT | Performed by: PHYSICAL THERAPIST

## 2019-01-03 PROCEDURE — G8978 MOBILITY CURRENT STATUS: HCPCS | Performed by: PHYSICAL THERAPIST

## 2019-01-03 NOTE — PROGRESS NOTES
Daily Note     Today's date: 1/3/2019  Patient name: Lillian Wagoner  : 1955  MRN: [id number]  Referring provider: Gracy Leija MD  Dx:   Encounter Diagnosis     ICD-10-CM    1  Plantar fasciitis, left M72 2                 Subjective: Pt reports that the heel cup has helped, she is pleased with her progress and ready for DC to HEP  Objective: See treatment diary below      Assessment: goals have been met       Plan: DC to HEP    Precautions: none     Daily Treatment Diary         Manuals 12/4  12/7  12/12  12/14  12/18  12/20  12/31  1/3       jt mobs: post shaw      3'  PT,MB  3 mins  4'  4'  2' 4'       GT L foot/calf    mo  foot  10 mins  6', TPM to calf  foot 4'  4'          laser     4'  4 mins  4'  4'  4'  4'                               Exercise Diary                        bike    5'  10  10 mins  10  10  10'  10'       Short foot    5"x10  3'  3 mins  3'  3'  3'  3'       Toe splay    5"x10  3'  3 mins  3'  3'  3'  3'       gastroc and soleus stretch    30"x3  each  30"x3  30"x3 ea   30"x3  30"x3  30"x3 ea  30"x3 ea        trial of heel cup             x                                                                                                                                                                                                                                                                                                                                                                                                                                 Modalities                       ionto x  x  x  performed  np  x  x

## 2019-01-21 RX ORDER — DEXAMETHASONE SODIUM PHOSPHATE 4 MG/ML
INJECTION, SOLUTION INTRA-ARTICULAR; INTRALESIONAL; INTRAMUSCULAR; INTRAVENOUS; SOFT TISSUE
Refills: 0 | COMMUNITY
Start: 2018-12-03 | End: 2021-01-04 | Stop reason: ALTCHOICE

## 2019-01-24 ENCOUNTER — OFFICE VISIT (OUTPATIENT)
Dept: OBGYN CLINIC | Facility: CLINIC | Age: 64
End: 2019-01-24
Payer: COMMERCIAL

## 2019-01-24 VITALS
BODY MASS INDEX: 28.16 KG/M2 | SYSTOLIC BLOOD PRESSURE: 152 MMHG | WEIGHT: 153 LBS | HEART RATE: 61 BPM | HEIGHT: 62 IN | DIASTOLIC BLOOD PRESSURE: 91 MMHG

## 2019-01-24 DIAGNOSIS — M72.2 PLANTAR FASCIITIS, LEFT: Primary | ICD-10-CM

## 2019-01-24 DIAGNOSIS — G57.61 MORTON'S NEUROMA OF RIGHT FOOT: ICD-10-CM

## 2019-01-24 PROCEDURE — 99213 OFFICE O/P EST LOW 20 MIN: CPT | Performed by: ORTHOPAEDIC SURGERY

## 2019-01-24 NOTE — PROGRESS NOTES
SURINDER Calle  Attending, Orthopaedic Surgery  Foot and [facility] [address] [facility]      ORTHOPAEDIC FOOT AND ANKLE CLINIC VISIT     Assessment:     Encounter Diagnoses   Name Primary?  Plantar fasciitis, left Yes    Tuttle's neuroma of right foot             Plan:   · The patient verbalized understanding of exam findings and treatment plan  We engaged in the shared decision-making process and treatment options were discussed at length with the patient  Surgical and conservative management discussed today along with risks and benefits  · She is to continue with HEP  · Continue with visco heel cups, powerstep orthotics, night splint and stiff soled shoe wear  · She may continue activity to tolerance  · Continue with metatarsal pad for the right foot  Return in about 3 months (around 4/24/2019) for Recheck left plantar fasciitis  Reynold Aragon History of Present Illness:   Chief Complaint:   Chief Complaint   Patient presents with    Left Foot - Follow-up    Right Foot - Follow-up     Juarez Choudhury is a 59 y o  female who is being seen in follow-up for left plantar fasciitis  When we last saw she we recommended physical therapy with iontophoresis, visco heel cups, night splint, and activity modification for left foot, metatarsal pad for right foot  Pain has significantly improved  Residual pain is localized at left medial band of the plantar fascia with minimal radiating and described as sharp and severe  Pain/symptom timing:  Worse during the day when active  Pain/symptom context:  Worse with activites and work  Pain/symptom modifying factors:  Rest makes better, activities make worse  Pain/symptom associated signs/symptoms: none    Prior treatment   · NSAIDsYes   · Injections No   · Bracing/Orthotics Yes    · Physical Therapy Yes     Orthopedic Surgical History:   See previous note      Past Medical, Surgical and Social History:  Past Medical History:  has a past medical history of Endometriosis; Female infertility; Fibroid; Recurrent pregnancy loss, antepartum condition or complication; and Skin cancer  Problem List:  does not have any pertinent problems on file  Past Surgical History:  has a past surgical history that includes LAPAROSCOPY  Family History: family history includes Breast cancer in her mother; Coronary artery disease in her father; Hypertension in her father  Social History:  reports that she has never smoked  She has never used smokeless tobacco  She reports that she does not drink alcohol or use drugs  Current Medications: has a current medication list which includes the following prescription(s): vitamin d3, dexamethasone, dexamethasone, escitalopram, fexofenadine, fluticasone, spenco gel heel cup med/lg, hydrocortisone, ketoconazole, sulfacetamide sodium-sulfur, tobradex, and triamcinolone, and the following Facility-Administered Medications: dexamethasone  Allergies: is allergic to methylisothiazolinone  Review of Systems:  General- denies fever/chills  HEENT- denies hearing loss or sore throat  Eyes- denies eye pain or visual disturbances, denies red eyes  Respiratory- denies cough or SOB  Cardio- denies chest pain or palpitations  GI- denies abdominal pain  Endocrine- denies urinary frequency  Urinary- denies pain with urination  Musculoskeletal- Negative except noted above  Skin- denies rashes or wounds  Neurological- denies dizziness or headache  Psychiatric- denies anxiety or difficulty concentrating    Physical Exam:   /91 (BP Location: Left arm, Patient Position: Sitting, Cuff Size: Adult)   Pulse 61   Ht 5' 1 5" (1 562 m)   Wt 69 4 kg (153 lb)   BMI 28 44 kg/m²   General/Constitutional: No apparent distress: well-nourished and well developed    Eyes: normal ocular motion  Lymphatic: No appreciable lymphadenopathy  Respiratory: Non-labored breathing  Vascular: No edema, swelling or tenderness, except as noted in detailed exam   Integumentary: No impressive skin lesions present, except as noted in detailed exam   Neuro: No ataxia or tremors noted  Psych: Normal mood and affect, oriented to person, place and time  Appropriate affect  Musculoskeletal: Normal, except as noted in detailed exam and in HPI  Examination    Left    Gait Normal   Musculoskeletal Tender to palpation at medial band of the plantar fascia improved from last visit  Skin Normal       Nails Normal    Range of Motion  20 degrees dorsiflexion, 45 degrees plantarflexion  Subtalar motion: wnl    Stability Stable    Muscle Strength 5/5 tibialis anterior  5/5 gastrocnemius-soleus  5/5 posterior tibialis  5/5 peroneal/eversion strength  5/5 EHL  5/5 FHL    Neurologic Normal    Sensation Intact to light touch throughout sural, saphenous, superficial peroneal, deep peroneal and medial/lateral plantar nerve distributions  [place] 5 07 filament (10g) testing deferred  Cardiovascular Brisk capillary refill < 2 seconds,intact DP and PT pulses    Special Tests None      Imaging Studies:   No new imaging    Scribe Attestation    I,:   Dayton Lama am acting as a scribe while in the presence of the attending physician :        I,:   Rima Juarez MD personally performed the services described in this documentation    as scribed in my presence :                Doran Skipper Lachman, MD  Foot & Ankle Surgery   Department of [address] [place] [address]      I personally performed the service  Doran Skipper Lachman, MD

## 2019-01-24 NOTE — PATIENT INSTRUCTIONS
Plantar Fasciitis   Page Content   If your first few steps out of bed in the morning cause severe pain in the heel of your foot, you may have plantar fasciitis (fashee-EYE-tiss), an overuse injury that affects the sole of the foot  A diagnosis of plantar fasciitis means you have inflamed the tough, fibrous band of tissue (fascia) connecting your heel bone to the base of your toes  You're more likely to develop the condition if you're female, overweight or have a job that requires a lot of walking or standing on hard surfaces  You're also at risk if you walk or run for exercise, especially if you have tight calf muscles that limit how far you can flex your ankles  People with very flat feet or very high arches also are more prone to plantar fasciitis  The condition typically starts gradually with mild pain at the heel bone often referred to as a stone bruise  You're more likely to feel it after (not during) exercise  The pain classically occurs right after getting up in the morning and after a period of sitting  If you don't treat plantar fasciitis, it may become a chronic condition  You may not be able to keep up your level of activity, and you may develop symptoms of foot, knee, hip and back problems because plantar fasciitis can change the way you walk  Treatment  Stretching is the best treatment for plantar fasciitis  It may help to try to keep weight off your foot until the initial inflammation goes away  You can also apply ice to the sore area for 20 minutes three or four times a day to relieve your symptoms  Often a doctor will prescribe a nonsteroidal anti-inflammatory medication such as ibuprofen or naproxen  Home exercises to stretch your Achilles tendon and plantar fascia are the mainstay of treatment and reduce the chance of recurrence  In one exercise, you lean forward against a wall with one knee straight and heel on the ground  Your other knee is bent   Your heel cord and foot arch stretch as you lean  Hold for 10 seconds, relax and straighten up  Repeat 20 times for each sore heel  It is important to keep the knee fully extended on the side being stretched  In another exercise, you lean forward onto a countertop, spreading your feet apart with one foot in front of the other  Flex your knees and squat down, keeping your heels on the ground as long as possible  Your heel cords and foot arches will stretch as the heels come up in the stretch  Hold for 10 seconds, relax and straighten up  Repeat 20 times  About 90 percent of people with plantar fasciitis improve significantly after two months of initial treatment  You may be advised to use shoes with shock-absorbing soles or fitted with an off-the-shelf shoe insert device like a rubber heel pad  Your foot may be taped into a specific position  If your plantar fasciitis continues after a few months of conservative treatment, your doctor may inject your heel with steroidal anti-inflammatory medication  If you still have symptoms, you may need to wear a walking cast for two to three weeks or a positional splint when you sleep  In a few cases, surgery is needed for chronically contracted tissue  Plantar Fascia-Specific Stretching Program  1  Cross your affected leg over your other leg  2  Using the hand on your affected side, take hold of your affected foot and pull your toes back towards shin  This creates tension/stretch in the arch of the foot/plantar fascia  3  Check for the appropriate stretch position by gently rubbing the thumb of your unaffected side left to right over the arch of the affected foot  The plantar fascia should feel firm, like a guitar string  4  Hold the stretch for a count of 10  A set is 10 repetitions  Perform at least three sets of stretches per day  You cannot perform the stretch too often   The most important times to stretch are before taking the first step in the morning and before standing after a period of prolonged sitting  Anti-inflammatory Medication  Anti-inflammatory medications can help decrease the inflammation in the arch and heel of your foot  These medications include Advil®, Motrin®, Ibuprofen, and Aleve®  1  Use the medication as directed on the package  If you tolerate it well, take it daily for two weeks then discontinue for one week  If symptoms worsen or return, resume for two weeks, then stop  2  You should eat when taking these medications, as they can be hard on your stomach  Arch Support  1  Over the counter inserts [facility]'s) provide added arch support and soft cushion  2  Based on the individual needs of your foot, you may require custom inserts  Additional Stretch: Achilles Tendon Stretch  1  Place a shoe insert under your affected foot  2  Place your affected leg behind your unaffected leg with the toes of your back foot pointed towards the heel of your other foot  3  Lean into the wall  4  Bend your front knee while keeping your back leg straight with your heel firmly on the ground  5  Hold the stretch for a count of 10  A set is 10 repetitions  6  Perform the stretch at least three times a day

## 2019-03-11 ENCOUNTER — EVALUATION (OUTPATIENT)
Dept: PHYSICAL THERAPY | Facility: CLINIC | Age: 64
End: 2019-03-11
Payer: COMMERCIAL

## 2019-03-11 DIAGNOSIS — S16.1XXA ACUTE STRAIN OF NECK MUSCLE, INITIAL ENCOUNTER: Primary | ICD-10-CM

## 2019-03-11 PROCEDURE — 97162 PT EVAL MOD COMPLEX 30 MIN: CPT | Performed by: PHYSICAL THERAPIST

## 2019-03-11 NOTE — PROGRESS NOTES
Physical Therapy Initial Evaluation    Today's date: 3/11/2019  Patient name: Sebastian Velazquez  : 1955  MRN: [id number]  Referring provider: Angelica Alaniz MD  Dx:   Encounter Diagnosis     ICD-10-CM    1  Acute strain of neck muscle, initial encounter S16  1XXA                   Assessment  Assessment details: Pt's problem list: c/o pain, poor posture, decreased MMT in R UE, (+) tenderness and trigger points in post C-spine/mid back mm , decreased C-spine ROM due to pain with movement  Impairments: abnormal or restricted ROM, abnormal movement, activity intolerance, impaired physical strength, pain with function, poor posture  and poor body mechanics  Understanding of Dx/Px/POC: excellent  Goals  STG's (1-3 weeks)  1  S with HEP  2  S with postural education  3  Increase C-spine AROM to [facility] t/o  LTG's (4-6 weeks)  1  Independent with all HEP  2  Independent with self postural correction  3  R UE MMT 5/5 t/o - no limitations to max safe use of UE with ADL's at home  Plan  Patient would benefit from: skilled physical therapy  Planned modality interventions: traction, ultrasound and low level laser therapy  Planned therapy interventions: manual therapy, massage, neuromuscular re-education, patient education, postural training, strengthening, stretching, therapeutic activities, therapeutic exercise, therapeutic training, home exercise program, functional ROM exercises, flexibility and body mechanics training  Frequency: 2x week  Duration in weeks: 6  Treatment plan discussed with: patient        Subjective Evaluation    History of Present Illness  Date of onset: 2019  Mechanism of injury: Pt  Is 58 y/o female c/o gradual onset of post  neck pain after working out in a gym  Symptoms are getting slightly better, but not completely        Current functional limitation: on/off HA, AROM of C-spine, lifting weights while exercising, on/off pain at night (usually sleeps on R side), pain with driving and turning neck  Not a recurrent problem   Quality of life: good    Pain  Current pain ratin  At best pain ratin  At worst pain rating: 10  Location: post C-spine region  Quality: sharp and knife-like  Relieving factors: change in position, rest, heat, medications and relaxation  Aggravating factors: lifting, overhead activity and keyboarding  Progression: improved    Hand dominance: right      Diagnostic Tests  No diagnostic tests performed  Treatments  Previous treatment: medication  Current treatment: medication  Patient Goals  Patient goals for therapy: independence with ADLs/IADLs, return to sport/leisure activities, increased motion, decreased pain and increased strength          Objective     Static Posture     Head  Forward  Shoulders  Rounded  Thoracic Spine  Flattened thoracic spine  Lumbar Spine   Decreased lordosis  Postural Observations  Seated posture: poor  Standing posture: poor  Correction of posture: makes symptoms better        Palpation   Left   Tenderness of the sternocleidomastoid and suboccipitals  Trigger point to cervical paraspinals, rhomboids, sternocleidomastoid and suboccipitals  Right   Tenderness of the sternocleidomastoid and suboccipitals  Trigger point to cervical paraspinals, rhomboids, sternocleidomastoid and suboccipitals       Neurological Testing     Sensation   Cervical/Thoracic   Left   Intact: light touch    Right   Intact: light touch    Active Range of Motion   Cervical/Thoracic Spine       Cervical  Subcranial protraction:  WFL   Subcranial retraction:   Restriction level: minimal  Flexion:  WFL  Extension:  Restriction level: minimal  Left lateral flexion:  Restriction level: minimal  Right lateral flexion:  Restriction level minimal    Thoracic    Flexion:  Restriction level: minimal  Extension:  Restriction level: minimal  Left lateral flexion:  Restriction level: minimal  Right lateral flexion:  Restriction level: minimal  Left rotation:  Restriction level: minimal  Right rotation:  Restriction level: minimal    Strength/Myotome Testing   Cervical Spine     Left   Normal strength    Right Shoulder     Planes of Motion   Flexion: 4+   Extension: 4+   Abduction: 4+   Adduction: 4+   External rotation at 0°: 4+   Internal rotation at 0°: 4+     Right Elbow   Flexion: 5  Extension: 5    Additional Strength Details  R UE MMT decreased due to on/off neck pain with functional use of UE            Precautions: not any given      Daily Treatment Diary     Manual  3/11            TPR C-sine             PROM/Stretch C-spine                                                        Exercise Diary  3/11            C-retr /C-roll             C-ext /C-roll             Postural edu/HEP review             UBE/B'kwrd/L-roll use                                                                                                                                                                                                                                 Modalities  3/11                         MARIEL             LA

## 2019-03-11 NOTE — LETTER
2019    [place] [place] [address]  [address] [facility] [address]    Patient: Rosalind Dunne   YOB: 1955   Date of Visit: 3/11/2019     Encounter Diagnosis     ICD-10-CM    1  Acute strain of neck muscle, initial encounter S16  1XXA        Dear Dr Jennifer Peace:    Please review the attached Plan of Care from [place] [address] recent visit  Please verify that you agree therapy should continue by signing the attached document and sending it back to our office  If you have any questions or concerns, please don't hesitate to call  Sincerely,    Yani Costa, PT      Referring Provider:      I certify that I have read the below Plan of Care and certify the need for these services furnished under this plan of treatment while under my care  Roger Varma MD  [address] [place] [id number]  [address] [place] [address] Avenue: [address] [place] [address]          Physical Therapy Initial Evaluation    Today's date: 3/11/2019  Patient name: Rosalind Dunne  : 1955  MRN: [id number]  Referring provider: Jose Gilmore MD  Dx:   Encounter Diagnosis     ICD-10-CM    1  Acute strain of neck muscle, initial encounter S16  1XXA                   Assessment  Assessment details: Pt's problem list: c/o pain, poor posture, decreased MMT in R UE, (+) tenderness and trigger points in post C-spine/mid back mm , decreased C-spine ROM due to pain with movement  Impairments: abnormal or restricted ROM, abnormal movement, activity intolerance, impaired physical strength, pain with function, poor posture  and poor body mechanics  Understanding of Dx/Px/POC: excellent  Goals  STG's (1-3 weeks)  1  S with HEP  2  S with postural education  3  Increase C-spine AROM to [facility] t/o  LTG's (4-6 weeks)  1  Independent with all HEP  2  Independent with self postural correction  3  R UE MMT 5/5 t/o - no limitations to max safe use of UE with ADL's at home      Plan  Patient would benefit from: skilled physical therapy  Planned modality interventions: traction, ultrasound and low level laser therapy  Planned therapy interventions: manual therapy, massage, neuromuscular re-education, patient education, postural training, strengthening, stretching, therapeutic activities, therapeutic exercise, therapeutic training, home exercise program, functional ROM exercises, flexibility and body mechanics training  Frequency: 2x week  Duration in weeks: 6  Treatment plan discussed with: patient        Subjective Evaluation    History of Present Illness  Date of onset: 2019  Mechanism of injury: Pt  Is 58 y/o female c/o gradual onset of post  neck pain after working out in a gym  Symptoms are getting slightly better, but not completely  Current functional limitation: on/off HA, AROM of C-spine, lifting weights while exercising, on/off pain at night (usually sleeps on R side), pain with driving and turning neck  Not a recurrent problem   Quality of life: good    Pain  Current pain ratin  At best pain ratin  At worst pain rating: 10  Location: post C-spine region  Quality: sharp and knife-like  Relieving factors: change in position, rest, heat, medications and relaxation  Aggravating factors: lifting, overhead activity and keyboarding  Progression: improved    Hand dominance: right      Diagnostic Tests  No diagnostic tests performed  Treatments  Previous treatment: medication  Current treatment: medication  Patient Goals  Patient goals for therapy: independence with ADLs/IADLs, return to sport/leisure activities, increased motion, decreased pain and increased strength          Objective     Static Posture     Head  Forward  Shoulders  Rounded  Thoracic Spine  Flattened thoracic spine  Lumbar Spine   Decreased lordosis       Postural Observations  Seated posture: poor  Standing posture: poor  Correction of posture: makes symptoms better        Palpation   Left   Tenderness of the sternocleidomastoid and suboccipitals  Trigger point to cervical paraspinals, rhomboids, sternocleidomastoid and suboccipitals  Right   Tenderness of the sternocleidomastoid and suboccipitals  Trigger point to cervical paraspinals, rhomboids, sternocleidomastoid and suboccipitals  Neurological Testing     Sensation   Cervical/Thoracic   Left   Intact: light touch    Right   Intact: light touch    Active Range of Motion   Cervical/Thoracic Spine       Cervical  Subcranial protraction:  WFL   Subcranial retraction:   Restriction level: minimal  Flexion:  WFL  Extension:  Restriction level: minimal  Left lateral flexion:  Restriction level: minimal  Right lateral flexion:  Restriction level minimal    Thoracic    Flexion:  Restriction level: minimal  Extension:  Restriction level: minimal  Left lateral flexion:  Restriction level: minimal  Right lateral flexion:  Restriction level: minimal  Left rotation:  Restriction level: minimal  Right rotation:  Restriction level: minimal    Strength/Myotome Testing   Cervical Spine     Left   Normal strength    Right Shoulder     Planes of Motion   Flexion: 4+   Extension: 4+   Abduction: 4+   Adduction: 4+   External rotation at 0°:  4+   Internal rotation at 0°:  4+     Right Elbow   Flexion: 5  Extension: 5    Additional Strength Details  R UE MMT decreased due to on/off neck pain with functional use of UE            Precautions: not any given      Daily Treatment Diary     Manual  3/11            TPR C-sine             PROM/Stretch C-spine                                                        Exercise Diary  3/11            C-retr /C-roll             C-ext /C-roll             Postural edu/HEP review             UBE/B'kwrd/L-roll use                                                                                                                                                                                                                                 Modalities 3/11            JORDYN FLOYD             [place]

## 2019-03-12 ENCOUNTER — OFFICE VISIT (OUTPATIENT)
Dept: PHYSICAL THERAPY | Facility: CLINIC | Age: 64
End: 2019-03-12
Payer: COMMERCIAL

## 2019-03-12 DIAGNOSIS — S16.1XXA ACUTE STRAIN OF NECK MUSCLE, INITIAL ENCOUNTER: Primary | ICD-10-CM

## 2019-03-12 PROCEDURE — 97140 MANUAL THERAPY 1/> REGIONS: CPT | Performed by: PHYSICAL THERAPIST

## 2019-03-12 PROCEDURE — 97110 THERAPEUTIC EXERCISES: CPT | Performed by: PHYSICAL THERAPIST

## 2019-03-12 PROCEDURE — 97112 NEUROMUSCULAR REEDUCATION: CPT | Performed by: PHYSICAL THERAPIST

## 2019-03-12 NOTE — PROGRESS NOTES
Daily Note     Today's date: 3/12/2019  Patient name: Ailyn Lopez  : 1955  MRN: [id number]  Referring provider: Estrella Hoang MD  Dx:   Encounter Diagnosis     ICD-10-CM    1  Acute strain of neck muscle, initial encounter S16  1XXA                   Subjective: "No changes since yesterday "      Objective: See treatment diary below    Precautions: not any given        Daily Treatment Diary      Manual  3/11  3/12                   TPR C-sine    5'                   PROM/Stretch C-spine    5'                                                                                                 Exercise Diary  3/11  3/12                   C-retr /C-roll    10x1                   C-ext /C-roll    10x1                   Postural edu/HEP review    10'                   UBE/B'kwrd/L-roll use    8'                    B/L traps stretch    5x10"                    B/L C-rot stretch    5x10"                    prone REIL    5x10"                                                                                                                                                                                                                                                                                                                                                 Modalities  3/11  3/12                   MH    10'                   CP                       LA                                  Assessment: Tolerated treatment well  Patient started with various stretching techniques with postural education  HEP was given and reviewed and pt was educated on importance of daily HEP  Plan: Continue PT as per POC

## 2019-03-18 ENCOUNTER — APPOINTMENT (OUTPATIENT)
Dept: PHYSICAL THERAPY | Facility: CLINIC | Age: 64
End: 2019-03-18
Payer: COMMERCIAL

## 2019-03-20 ENCOUNTER — OFFICE VISIT (OUTPATIENT)
Dept: PHYSICAL THERAPY | Facility: CLINIC | Age: 64
End: 2019-03-20
Payer: COMMERCIAL

## 2019-03-20 DIAGNOSIS — S16.1XXA ACUTE STRAIN OF NECK MUSCLE, INITIAL ENCOUNTER: Primary | ICD-10-CM

## 2019-03-20 PROCEDURE — 97140 MANUAL THERAPY 1/> REGIONS: CPT | Performed by: PHYSICAL THERAPIST

## 2019-03-20 PROCEDURE — 97112 NEUROMUSCULAR REEDUCATION: CPT | Performed by: PHYSICAL THERAPIST

## 2019-03-20 PROCEDURE — 97110 THERAPEUTIC EXERCISES: CPT | Performed by: PHYSICAL THERAPIST

## 2019-03-20 NOTE — PROGRESS NOTES
Daily Note     Today's date: 3/20/2019  Patient name: Cristiano Magaña  : 1955  MRN: [id number]  Referring provider: Nash Mccullough MD  Dx:   Encounter Diagnosis     ICD-10-CM    1  Acute strain of neck muscle, initial encounter S16  1XXA                   Subjective: "I am feeling better  I do my home exercises about 2x per day "      Objective: See treatment diary below  Precautions: not any given        Daily Treatment Diary      Manual  3/11  3/12  3/20                 TPR C-sine    5'  5'                 PROM/Stretch C-spine    5'  5'                                                                                               Exercise Diary  3/11  3/12  3/20                 C-retr /C-roll    10x1  15x1                 C-ext /C-roll    10x1  15x1                 Postural edu/HEP review    10' 10'                 UBE/B'kwrd/L-roll use    8'  10'                  B/L traps stretch    5x10"  10x10"                  B/L C-rot stretch    5x10"  10x10"                  prone REIL    5x10"                                            Corner stretch      5x10"                                                                                                                                                                                                                                                                                               Modalities  3/11  3/12  3/20                 MH    10'  10'                 CP                       LA                                 Assessment: Tolerated treatment well  Patient continues to work on various neck/upper back ex in yohana range  No discomfort after tx verbalized  Plan: Continue PT as per POC

## 2019-03-25 ENCOUNTER — OFFICE VISIT (OUTPATIENT)
Dept: PHYSICAL THERAPY | Facility: CLINIC | Age: 64
End: 2019-03-25
Payer: COMMERCIAL

## 2019-03-25 DIAGNOSIS — S16.1XXA ACUTE STRAIN OF NECK MUSCLE, INITIAL ENCOUNTER: Primary | ICD-10-CM

## 2019-03-25 PROCEDURE — 97110 THERAPEUTIC EXERCISES: CPT | Performed by: PHYSICAL THERAPIST

## 2019-03-25 PROCEDURE — 97112 NEUROMUSCULAR REEDUCATION: CPT | Performed by: PHYSICAL THERAPIST

## 2019-03-25 PROCEDURE — 97530 THERAPEUTIC ACTIVITIES: CPT | Performed by: PHYSICAL THERAPIST

## 2019-03-25 NOTE — PROGRESS NOTES
Daily Note     Today's date: 3/25/2019  Patient name: Buddy Cornell  : 1955  MRN: [id number]  Referring provider: Francisca Rangel MD  Dx:   Encounter Diagnosis     ICD-10-CM    1  Acute strain of neck muscle, initial encounter S16  1XXA                   Subjective: "I am much better  No pain today "      Objective: See treatment diary below    Manual  3/11 3/12 3/20 3/25         TPR C-sine  5' 5'          PROM/Stretch C-spine  5' 5'                                                   Exercise Diary  3/11 3/12 3/20 3/25         C-retr /C-roll  10x1 15x1 15x1         C-ext /C-roll  10x1 15x1 15x1         Postural edu/HEP review  10' 10' 10'         UBE/B'kwrd/L-roll use  8' 10' 10'         B/L traps stretch  5x10" 10x10" 10x10"         B/L C-rot stretch  5x10" 10x10" 10x10"         prone REIL  5x10"           C-traction unit use    20'         Corner stretch   5x10" 5x15"         B UE flexion with chin tuck    10x5"                                                                                                                                             Modalities  3/11 3/12 3/20 3/25         MH  10' 10' 10'         CP             LA                 Assessment: Tolerated treatment well, no pain after tx  Patient is able to reach full ext ROM in C-spine with postural correction and C-spine traction unit use  Plan: Continue PT as per POC

## 2019-03-27 ENCOUNTER — OFFICE VISIT (OUTPATIENT)
Dept: PHYSICAL THERAPY | Facility: CLINIC | Age: 64
End: 2019-03-27
Payer: COMMERCIAL

## 2019-03-27 DIAGNOSIS — S16.1XXA ACUTE STRAIN OF NECK MUSCLE, INITIAL ENCOUNTER: Primary | ICD-10-CM

## 2019-03-27 PROCEDURE — 97110 THERAPEUTIC EXERCISES: CPT

## 2019-03-27 PROCEDURE — 97530 THERAPEUTIC ACTIVITIES: CPT

## 2019-03-27 PROCEDURE — 97112 NEUROMUSCULAR REEDUCATION: CPT

## 2019-03-27 NOTE — PROGRESS NOTES
Daily Note     Today's date: 3/27/2019  Patient name: Dillan Sanchez  : 1955  MRN: [id number]  Referring provider: Liz Jaimes MD  Dx:   Encounter Diagnosis     ICD-10-CM    1  Acute strain of neck muscle, initial encounter S16  1XXA                   Subjective: Pt cont to report she is feeling better, cerv pain level 2/10, upon arrival to therapy  Notes discomfort with cerv rotation to the R  Compliant w/HEP  Objective: See treatment diary below    Manual  3/11 3/12 3/20 3/25 3/27        TPR C-sine  5' 5'          PROM/Stretch C-spine  5' 5'                                                   Exercise Diary  3/11 3/12 3/20 3/25 3/27        C-retr /C-roll  10x1 15x1 15x1 15x1        C-ext /C-roll  10x1 15x1 15x1 15x1        Postural edu/HEP review  10' 10' 10' NP        UBE/B'kwrd/L-roll use  8' 10' 10' 10'        B/L traps stretch  5x10" 10x10" 10x10" 10  X10"        B/L C-rot stretch  5x10" 10x10" 10x10" 10  x10"        prone REIL  5x10"           C-traction unit use    20' 5'x4        Corner stretch   5x10" 5x15"         B UE flexion with chin tuck    10x5" 10  X5"                                                                                                                                            Modalities  3/11 3/12 3/20 3/25 3/27        MH  10' 10' 10' 10'pre        CP             LA                 Assessment: Noted cerv fatigue by end of cerv  trx  Performed exercise program w/o increasing symptoms  Will monitor  Plan: Continue PT as per POC

## 2019-04-01 ENCOUNTER — OFFICE VISIT (OUTPATIENT)
Dept: PHYSICAL THERAPY | Facility: CLINIC | Age: 64
End: 2019-04-01
Payer: COMMERCIAL

## 2019-04-01 DIAGNOSIS — S16.1XXA ACUTE STRAIN OF NECK MUSCLE, INITIAL ENCOUNTER: Primary | ICD-10-CM

## 2019-04-01 PROCEDURE — 97530 THERAPEUTIC ACTIVITIES: CPT | Performed by: PHYSICAL THERAPIST

## 2019-04-01 PROCEDURE — 97112 NEUROMUSCULAR REEDUCATION: CPT | Performed by: PHYSICAL THERAPIST

## 2019-04-01 PROCEDURE — 97110 THERAPEUTIC EXERCISES: CPT | Performed by: PHYSICAL THERAPIST

## 2019-04-03 ENCOUNTER — OFFICE VISIT (OUTPATIENT)
Dept: PHYSICAL THERAPY | Facility: CLINIC | Age: 64
End: 2019-04-03
Payer: COMMERCIAL

## 2019-04-03 DIAGNOSIS — S16.1XXA ACUTE STRAIN OF NECK MUSCLE, INITIAL ENCOUNTER: Primary | ICD-10-CM

## 2019-04-03 PROCEDURE — 97110 THERAPEUTIC EXERCISES: CPT | Performed by: PHYSICAL THERAPIST

## 2019-04-03 PROCEDURE — 97112 NEUROMUSCULAR REEDUCATION: CPT | Performed by: PHYSICAL THERAPIST

## 2019-04-03 PROCEDURE — 97140 MANUAL THERAPY 1/> REGIONS: CPT | Performed by: PHYSICAL THERAPIST

## 2019-05-07 ENCOUNTER — OFFICE VISIT (OUTPATIENT)
Dept: OBGYN CLINIC | Facility: CLINIC | Age: 64
End: 2019-05-07
Payer: COMMERCIAL

## 2019-05-07 VITALS
SYSTOLIC BLOOD PRESSURE: 157 MMHG | HEART RATE: 60 BPM | HEIGHT: 62 IN | WEIGHT: 153 LBS | DIASTOLIC BLOOD PRESSURE: 89 MMHG | BODY MASS INDEX: 28.16 KG/M2

## 2019-05-07 DIAGNOSIS — G57.61 MORTON'S NEUROMA OF RIGHT FOOT: Primary | ICD-10-CM

## 2019-05-07 DIAGNOSIS — M72.2 PLANTAR FASCIITIS, LEFT: ICD-10-CM

## 2019-05-07 PROCEDURE — 99213 OFFICE O/P EST LOW 20 MIN: CPT | Performed by: ORTHOPAEDIC SURGERY

## 2019-08-29 ENCOUNTER — OFFICE VISIT (OUTPATIENT)
Dept: OBGYN CLINIC | Facility: CLINIC | Age: 64
End: 2019-08-29
Payer: COMMERCIAL

## 2019-08-29 VITALS
WEIGHT: 145 LBS | HEIGHT: 62 IN | DIASTOLIC BLOOD PRESSURE: 82 MMHG | BODY MASS INDEX: 26.68 KG/M2 | SYSTOLIC BLOOD PRESSURE: 130 MMHG | HEART RATE: 71 BPM

## 2019-08-29 DIAGNOSIS — G57.61 MORTON'S NEUROMA OF RIGHT FOOT: Primary | ICD-10-CM

## 2019-08-29 DIAGNOSIS — M72.2 PLANTAR FASCIITIS, LEFT: ICD-10-CM

## 2019-08-29 PROCEDURE — 99213 OFFICE O/P EST LOW 20 MIN: CPT | Performed by: ORTHOPAEDIC SURGERY

## 2019-08-29 NOTE — PROGRESS NOTES
SURINDER Carrington  Attending, Orthopaedic Surgery  [facility] [address] [facility]      ORTHOPAEDIC FOOT AND ANKLE CLINIC VISIT     Assessment:     Encounter Diagnoses   Name Primary?  Tuttle's neuroma of right foot Yes    Plantar fasciitis, left             Plan:   · The patient verbalized understanding of exam findings and treatment plan  We engaged in the shared decision-making process and treatment options were discussed at length with the patient  Surgical and conservative management discussed today along with risks and benefits  · She is to continue with HEP to include golf ball stretch and ice massage  · Continue with metatarsal pad for right forefoot  · Continue with proper shoe wear  · Initiate activity modification when necessary  Return if symptoms worsen or fail to improve  History of Present Illness:   Chief Complaint:   Chief Complaint   Patient presents with    Left Foot - Follow-up    Right Foot - Follow-up     Rockford Ganser is a 59 y o  female who is being seen in follow-up for left foot pain secondary to plantar fasciitis and Tuttle's neuroma of the right forefoot  When we last saw she we recommended proper shoe wear, HEP, plantar fascia night splint, metatarsal pad for the right foot, activity modification  Pain has 85% improved  Residual pain is localized at right forefoot with minimal radiating and described as sharp and severe  Pain/symptom timing:  Worse during the day when active  Pain/symptom context:  Worse with activites and work  Pain/symptom modifying factors:  Rest makes better, activities make worse  Pain/symptom associated signs/symptoms: none    Prior treatment   · NSAIDsYes   · Injections No   · Bracing/Orthotics Yes    · Physical Therapy Yes     Orthopedic Surgical History:   See below      Past Medical, Surgical and Social History:  Past Medical History:  has a past medical history of Endometriosis, Female infertility, Fibroid, Recurrent pregnancy loss, antepartum condition or complication, and Skin cancer  Problem List: does not have any pertinent problems on file  Past Surgical History:  has a past surgical history that includes LAPAROSCOPY  Family History: family history includes Breast cancer in her mother; Coronary artery disease in her father; Hypertension in her father  Social History:  reports that she has never smoked  She has never used smokeless tobacco  She reports that she does not drink alcohol or use drugs  Current Medications: has a current medication list which includes the following prescription(s): vitamin d3, dexamethasone, dexamethasone, escitalopram, fexofenadine, fluticasone, spenco gel heel cup med/lg, hydrocortisone, ketoconazole, sulfacetamide sodium-sulfur, tobradex, and triamcinolone, and the following Facility-Administered Medications: dexamethasone  Allergies: is allergic to iodinated diagnostic agents and methylisothiazolinone  Review of Systems:  General- denies fever/chills  HEENT- denies hearing loss or sore throat  Eyes- denies eye pain or visual disturbances, denies red eyes  Respiratory- denies cough or SOB  Cardio- denies chest pain or palpitations  GI- denies abdominal pain  Endocrine- denies urinary frequency  Urinary- denies pain with urination  Musculoskeletal- Negative except noted above  Skin- denies rashes or wounds  Neurological- denies dizziness or headache  Psychiatric- denies anxiety or difficulty concentrating    Physical Exam:   /82 (BP Location: Right arm, Patient Position: Sitting, Cuff Size: Adult)   Pulse 71   Ht 5' 1 5" (1 562 m)   Wt 65 8 kg (145 lb)   BMI 26 95 kg/m²   General/Constitutional: No apparent distress: well-nourished and well developed    Eyes: normal ocular motion  Lymphatic: No appreciable lymphadenopathy  Respiratory: Non-labored breathing  Vascular: No edema, swelling or tenderness, except as noted in detailed exam   Integumentary: No impressive skin lesions present, except as noted in detailed exam   Neuro: No ataxia or tremors noted  Psych: Normal mood and affect, oriented to person, place and time  Appropriate affect  Musculoskeletal: Normal, except as noted in detailed exam and in HPI  Examination    Left    Gait Normal   Musculoskeletal  No ttp of the medial band of the plantar fascia, No ttp of the right forefoot  Skin Normal       Nails Normal    Range of Motion  20 degrees dorsiflexion, 45 degrees plantarflexion  Subtalar motion: wnl    Stability Stable    Muscle Strength 5/5 tibialis anterior  5/5 gastrocnemius-soleus  5/5 posterior tibialis  5/5 peroneal/eversion strength  5/5 EHL  5/5 FHL    Neurologic Normal    Sensation Intact to light touch throughout sural, saphenous, superficial peroneal, deep peroneal and medial/lateral plantar nerve distributions  Phil[place]-Guido 5 07 filament (10g) testing deferred  Cardiovascular Brisk capillary refill < 2 seconds,intact DP and PT pulses    Special Tests None      Imaging Studies:   No new imaging    Scribe Attestation    I,:   Delfin Caba am acting as a scribe while in the presence of the attending physician :        I,:   Dimitri Jacobsen MD personally performed the services described in this documentation    as scribed in my presence :                Ava Hitch Lachman, MD  Foot & Ankle Surgery   Department of [address] [place] [address]      I personally performed the service  Ava Hitch Lachman, MD

## 2019-11-12 ENCOUNTER — ANNUAL EXAM (OUTPATIENT)
Dept: OBGYN CLINIC | Facility: CLINIC | Age: 64
End: 2019-11-12
Payer: COMMERCIAL

## 2019-11-12 VITALS
HEIGHT: 62 IN | DIASTOLIC BLOOD PRESSURE: 76 MMHG | BODY MASS INDEX: 27.34 KG/M2 | WEIGHT: 148.6 LBS | SYSTOLIC BLOOD PRESSURE: 138 MMHG

## 2019-11-12 DIAGNOSIS — D25.9 UTERINE LEIOMYOMA, UNSPECIFIED LOCATION: ICD-10-CM

## 2019-11-12 DIAGNOSIS — Z01.419 WOMEN'S ANNUAL ROUTINE GYNECOLOGICAL EXAMINATION: ICD-10-CM

## 2019-11-12 DIAGNOSIS — N95.2 ATROPHY OF VAGINA: ICD-10-CM

## 2019-11-12 DIAGNOSIS — R92.2 DENSE BREAST: Primary | ICD-10-CM

## 2019-11-12 DIAGNOSIS — Z12.31 VISIT FOR SCREENING MAMMOGRAM: ICD-10-CM

## 2019-11-12 PROCEDURE — 99396 PREV VISIT EST AGE 40-64: CPT | Performed by: OBSTETRICS & GYNECOLOGY

## 2019-11-12 NOTE — PATIENT INSTRUCTIONS
Vaginal Atrophy   WHAT YOU NEED TO KNOW:   What is vaginal atrophy? Vaginal atrophy is a condition that causes thinning, drying, and inflammation of vaginal tissue  This condition is caused by decreased levels of estrogen (a female sex hormone)  Vaginal atrophy can increase your risk for vaginal and urinary tract infections  Vaginal atrophy can worsen over time if not treated  What causes or increases your risk of vaginal atrophy? · Menopause     · Medicines that lower your estrogen levels, such as those used to treat breast cancer, endometriosis, or fibroids    · Radiation to your pelvic area     · Surgery to remove the ovaries    · Breastfeeding  What are the signs and symptoms of vaginal atrophy? · Vaginal dryness, itching, and burning    · Vaginal discharge    · Pain or discomfort during sex    · Light bleeding after sex    · Burning during urination    · Frequent, sudden, strong urges to urinate    · Urinary incontinence (loss of control of your bladder)  How is vaginal atrophy diagnosed? Your healthcare provider will ask about your symptoms  A pelvic exam will be done to examine your vagina and cervix  Your healthcare provider will place a speculum into your vagina to open and examine it  A sample of discharge from your vagina may be collected and tested  A urine test may also be done  How is vaginal atrophy treated? · Over-the counter vaginal moisturizers  can help reduce dryness  Your healthcare provider may recommend that you use a vaginal moisturizer several times each week and during sex  Only use creams that are made for vaginal use  Do  not  use petroleum jelly  Lubricants can be used during sex to decrease pain and discomfort  · Estrogen  may help decrease dryness  It may also lower your risk of vaginal infections if you are going through menopause  It can also help to relieve urinary symptoms  Estrogen may be prescribed in the form of a cream, tablet, or ring   These medicines can be applied or inserted into the vagina  Estrogen can also be prescribed in the form of a pill  When should I contact my healthcare provider? · You have a foul-smelling odor coming from your vagina  · You have a thick, cheese-like discharge from your vagina  · You have itching, swelling, or redness in your vagina  · You have pain or burning when you urinate  · Your urine smells bad  · Your symptoms do not improve, or they get worse  · You have questions or concerns about your condition or care  CARE AGREEMENT:   You have the right to help plan your care  Learn about your health condition and how it may be treated  Discuss treatment options with your caregivers to decide what care you want to receive  You always have the right to refuse treatment  The above information is an  only  It is not intended as medical advice for individual conditions or treatments  Talk to your doctor, nurse or pharmacist before following any medical regimen to see if it is safe and effective for you  © 2017 [employer] Information is for End User's use only and may not be sold, redistributed or otherwise used for commercial purposes  All illustrations and images included in CareNotes® are the copyrighted property of A D A M , Inc  or Charlse Jimenez

## 2019-11-12 NOTE — PROGRESS NOTES
Assessment/Plan   Diagnoses and all orders for this visit:    Dense breast    Uterine leiomyoma, unspecified location    Atrophy of vagina    Women's annual routine gynecological examination    Visit for screening mammogram  -     Mammo screening bilateral w 3d & cad; Future     1  Yearly exam-Pap smear deferred, self-breast awareness reviewed, calcium/vitamin-D recommendations discussed, mammogram request given, colonoscopy as per specialist   2  Intermittent pelvic discomfort-patient with rare discomfort  Given this history and prior history ovarian cyst, patient would like to be aggressive and follow with pelvic ultrasound which is reasonable  Exam is unremarkable today  She will return in the near future for ultrasound and office visit with me  3  Previous episode postmenopausal bleeding-none noted for years  4  Increased breast density-noted on most recent mammogram December 2018  She was counseled again about [facility]'s breast screening algorithm  She may consider ultrasound if breast density is still noted  She will have repeat 3D mammogram December 2019 as scheduled  Request was given  5  Uterine myomas-most recent ultrasound November 2018 demonstrated myoma 1 cm anteriorly fundal, 0 9 cm lower uterine segment, and 1 cm anterior intramural   We will assess at upcoming ultrasound  6  Vaginal atrophy-noted on exam today  Patient given vaginal lubrication/moisturizer sheet  She declines any treatment with vaginal estrogen, Osphena, or Intarosa  To follow up near future for ultrasound and office visit with me  Otherwise, follow-up 1 year for yearly exam or as needed  Subjective   Patient ID: Chelo Aguirre is a 59 y o  female  Vitals:    11/12/19 1303   BP: 138/76     Patient was seen today for yearly exam   Please see assessment plan for details        The following portions of the patient's history were reviewed and updated as appropriate: allergies, current medications, past family history, past medical history, past social history, past surgical history and problem list   Past Medical History:   Diagnosis Date    Endometriosis     Female infertility     Fibroid     Recurrent pregnancy loss, antepartum condition or complication     Skin cancer      Past Surgical History:   Procedure Laterality Date    LAPAROSCOPY       OB History    Para Term  AB Living   1       1     SAB TAB Ectopic Multiple Live Births   1              # Outcome Date GA Lbr Marc/2nd Weight Sex Delivery Anes PTL Lv   1 SAB                Current Outpatient Medications:     Cholecalciferol (VITAMIN D3) 54068 units CAPS, Take 4,000 Units by mouth  , Disp: , Rfl:     dexamethasone (DECADRON) 20 mg/5 mL SOLN, 1 ML (4 MG TOTAL) BY IONTOPHORESIS ROUTE EVERY 6 (SIX) HOURS, Disp: , Rfl: 0    dexamethasone (DECADRON) 4 mg/mL, 1 mL (4 mg total) by Iontophoresis route every 6 (six) hours, Disp: 20 mL, Rfl: 0    escitalopram (LEXAPRO) 10 mg tablet, Take by mouth, Disp: , Rfl:     fexofenadine (ALLEGRA) 30 MG tablet, Take by mouth as needed  , Disp: , Rfl:     fluticasone (FLONASE) 50 mcg/act nasal spray, 2 sprays into each nostril, Disp: , Rfl:     Foot Care Products (SPENCO GEL HEEL CUP MED/LG) MISC, by Does not apply route continuous Wear in all of your shoes, all the time  , Disp: 1 each, Rfl: 0    hydrocortisone 2 5 % lotion, Apply topically, Disp: , Rfl:     ketoconazole (NIZORAL) 2 % cream, Apply topically 2 (two) times a day as needed, Disp: , Rfl:     Sulfacetamide Sodium-Sulfur 9 8-4 8 % LIQD, Apply topically, Disp: , Rfl:     TOBRADEX ophthalmic ointment, APPLY INTO BOTH EYES AT BEDTIME FOR 7 DAYS, Disp: , Rfl: 1    triamcinolone (KENALOG) 0 1 % ointment, Reported on 2017, Disp: , Rfl:     Current Facility-Administered Medications:     dexamethasone (DECADRON) injection 4 mg, 4 mg, Iontophoresis, Daily, Jeralyn Hales Lachman, MD  Allergies   Allergen Reactions    Iodinated Diagnostic Agents     Methylisothiazolinone Itching and Rash     Social History     Socioeconomic History    Marital status: /Civil Union     Spouse name: None    Number of children: None    Years of education: None    Highest education level: None   Occupational History    None   Social Needs    Financial resource strain: None    Food insecurity:     Worry: None     Inability: None    Transportation needs:     Medical: None     Non-medical: None   Tobacco Use    Smoking status: Never Smoker    Smokeless tobacco: Never Used   Substance and Sexual Activity    Alcohol use: No    Drug use: No    Sexual activity: None   Lifestyle    Physical activity:     Days per week: None     Minutes per session: None    Stress: None   Relationships    Social connections:     Talks on phone: None     Gets together: None     Attends Holiness service: None     Active member of club or organization: None     Attends meetings of clubs or organizations: None     Relationship status: None    Intimate partner violence:     Fear of current or ex partner: None     Emotionally abused: None     Physically abused: None     Forced sexual activity: None   Other Topics Concern    None   Social History Narrative    None     Family History   Problem Relation Age of Onset    Breast cancer Mother     Coronary artery disease Father     Hypertension Father        Review of Systems   Constitutional: Negative for chills, diaphoresis, fatigue and fever  Respiratory: Negative for apnea, cough, chest tightness, shortness of breath and wheezing  Cardiovascular: Negative for chest pain, palpitations and leg swelling  Gastrointestinal: Negative for abdominal distention, abdominal pain, anal bleeding, constipation, diarrhea, nausea, rectal pain and vomiting  Genitourinary: Negative for difficulty urinating, dyspareunia, dysuria, frequency, hematuria, menstrual problem, pelvic pain, urgency, vaginal bleeding, vaginal discharge and vaginal pain  Musculoskeletal: Negative for arthralgias, back pain and myalgias  Skin: Negative for color change and rash  Neurological: Negative for dizziness, syncope, light-headedness, numbness and headaches  Hematological: Negative for adenopathy  Does not bruise/bleed easily  Psychiatric/Behavioral: Negative for dysphoric mood and sleep disturbance  The patient is not nervous/anxious  Objective   Physical Exam    Objective      /76   Ht 5' 2" (1 575 m)   Wt 67 4 kg (148 lb 9 6 oz)   BMI 27 18 kg/m²     General:   alert and oriented, in no acute distress   Neck: normal to inspection and palpation   Breast: normal appearance, no masses or tenderness, Inspection negative   Heart:    Lungs:    Abdomen: soft, non-tender, without masses or organomegaly   Vulva: normal   Vagina: Without erythema or lesions or discharge  Mild to moderate atrophy noted   Cervix: Without lesions or discharge or cervicitis  No Cervical motion tenderness    Mildly atrophic   Uterus: mid-position, non-tender, size consistent with Top-normal to 6 weeks weeks   Adnexa: no mass, fullness, tenderness   Rectum: negative    Psych:  Normal mood and affect   Skin:  Without obvious lesions   Eyes: symmetric, with normal movements and reactivity   Musculoskeletal:  Normal muscle tone and movements appreciated

## 2019-12-03 ENCOUNTER — ULTRASOUND (OUTPATIENT)
Dept: OBGYN CLINIC | Facility: CLINIC | Age: 64
End: 2019-12-03
Payer: COMMERCIAL

## 2019-12-03 ENCOUNTER — OFFICE VISIT (OUTPATIENT)
Dept: OBGYN CLINIC | Facility: CLINIC | Age: 64
End: 2019-12-03
Payer: COMMERCIAL

## 2019-12-03 VITALS — WEIGHT: 148 LBS | SYSTOLIC BLOOD PRESSURE: 130 MMHG | BODY MASS INDEX: 27.07 KG/M2 | DIASTOLIC BLOOD PRESSURE: 86 MMHG

## 2019-12-03 DIAGNOSIS — D25.9 UTERINE LEIOMYOMA, UNSPECIFIED LOCATION: Primary | ICD-10-CM

## 2019-12-03 DIAGNOSIS — R92.2 DENSE BREAST: ICD-10-CM

## 2019-12-03 DIAGNOSIS — D21.9 FIBROIDS: Primary | ICD-10-CM

## 2019-12-03 DIAGNOSIS — R10.2 FEMALE PELVIC PAIN: ICD-10-CM

## 2019-12-03 PROCEDURE — 76830 TRANSVAGINAL US NON-OB: CPT | Performed by: OBSTETRICS & GYNECOLOGY

## 2019-12-03 PROCEDURE — 99213 OFFICE O/P EST LOW 20 MIN: CPT | Performed by: OBSTETRICS & GYNECOLOGY

## 2019-12-03 RX ORDER — ESCITALOPRAM OXALATE 5 MG/1
5 TABLET ORAL DAILY
COMMUNITY
Start: 2019-11-18 | End: 2021-01-04 | Stop reason: ALTCHOICE

## 2019-12-03 NOTE — PROGRESS NOTES
AMB US Pelvic Non OB  Date/Time: 12/3/2019 11:45 AM  Performed by: Wilberto Loyd  Authorized by: Doug Horton MD     Procedure details:     Indications: leiomyoma      Technique:  Transvaginal US, Non-OB    Position: lithotomy exam    Uterine findings:     Length (cm): 4 75    Height (cm):  2 44    Width (cm):  3 74    Endometrial stripe: identified      Endometrium thickness (mm):  1 2  Left ovary findings:     Left ovary:  Visualized    Length (cm): 2 21    Height (cm): 1 32    Width (cm): 1 41  Right ovary findings:     Right ovary:  Visualized    Length (cm): 2 11    Height (cm): 1 44    Width (cm): 1 56  Other findings:     Free pelvic fluid: not identified      Free peritoneal fluid: not identified    Post-Procedure Details:     Impression:  Retroverted uterus demonstrates stable fibroids  Largest are anterior fundal 1 0cm, lower uterine segment 0 9cm, and anterior intramural 1 0cm  Bilateral ovaries appear within normal limits  No free fluid  Tolerance: Tolerated well, no immediate complications    Complications: no complications    Additional Procedure Comments:      ResQU F8 E8C-RS transvaginal transducer Serial #[id number] was used to perform the examination today and subsequently followed with high level disinfection utilizing Trophon EPR procedure  Ultrasound performed at:     [address] [address]  Phone:  [phone]  Fax:  [phone]

## 2019-12-03 NOTE — PATIENT INSTRUCTIONS
Uterine Fibroids   WHAT YOU NEED TO KNOW:   What are uterine fibroids? Uterine fibroids are growths found inside your uterus (womb)  Uterine fibroids also may be called tumors (lumps) or leiomyomas  Uterine fibroids often appear in groups, or you may have only one  They can be small or large, and they can grow in size  They are almost always benign (not cancer) and likely will not spread to other parts of your body  What increases my risk of getting uterine fibroids? The cause of uterine fibroids is not clear  Ask your healthcare provider about these and other risk factors for uterine fibroids:  · Heredity:  Your risk for uterine fibroids may increase if a close family member also has fibroids  · Hormone imbalance:  Hormones are chemicals that affect your growth  Too many hormones may cause uterine fibroids or make them grow  · Early onset of menstrual periods: If you started your period at an early age, you may be at risk for uterine fibroids  · Childbearing age:  Uterine fibroids occur more often in women of childbearing age  They are even more common when you are 36to 61years old  They may grow when you are pregnant and shrink after you no longer have a monthly period  · Excess weight:  Too much body weight may increase your hormone levels and lead to uterine fibroids  Ask your healthcare provider about your ideal weight for your height  What are the signs and symptoms of uterine fibroids? You may have no signs or symptoms  Symptoms depend on the size, type, and number of fibroids you have  Symptoms also depend on where the fibroids are inside your uterus  Signs and symptoms of fibroids include the following:  · Heavy or painful menstrual bleeding  · Pelvic pressure and pain  You may have increased pelvic pain during sex  · Constipation or pain when you have a bowel movement (BM)  · Frequent need to urinate  How are uterine fibroids diagnosed?   Ask your healthcare provider about these and other tests that you may need:  · Blood tests: You may need blood taken to give caregivers information about how your body is working  The blood may be taken from your hand, arm, or IV  · Pelvic exam:  This is also called an internal or vaginal exam  During a pelvic exam, your healthcare provider gently puts a speculum into your vagina  A speculum is a tool that opens your vagina  This lets your healthcare provider see your cervix (bottom part of your uterus)  With gloved hands, your healthcare provider will check the size and shape of your uterus and ovaries  · Vaginal ultrasound:  During this test, your healthcare provider places a small wand in your vagina  Sound waves from the wand show pictures of the inside of your uterus (womb) and ovaries  · Biopsy:  A biopsy is a tissue sample of a fibroid that your healthcare provider takes from your uterus for testing  How are uterine fibroids treated? You may not need treatment for your fibroids if you do not have symptoms  The following treatments may shrink your fibroids and help your pain:  · Medicines:     ¨ Hormone medicine: This medicine changes the level of certain hormones and may then help shrink your fibroids  ¨ Contraceptives: These medicines help prevent pregnancy  They also may help shrink your fibroids  ¨ Nonsteroidal anti-inflammatory medicine: This group of medicine is also called NSAIDs  Nonsteroidal anti-inflammatory medicine may help decrease pain, fever, and swelling  This medicine can be bought without a doctor's order  This medicine can cause stomach bleeding or kidney problems in certain people  · Surgery: The type of surgery you may have depends on the size, number, and location of your fibroids  Ask your healthcare provider for more information about the following:     ¨ Embolization: This surgery blocks or slows the flow of blood to the fibroid  This may make your fibroids shrink or disappear  ¨ Myomectomy: This surgery removes your uterine fibroids  ¨ Hysterectomy:  For this surgery, your healthcare provider removes your uterus from your body  You may need a hysterectomy if your condition is severe (very bad)  After this surgery, you will no longer be able to have children  When should I contact my healthcare provider? · Your symptoms, such as heavy bleeding, pain, or pelvic pressure, worsen  · You feel weak and are more tired than usual      · You do not feel like your bladder is empty after you urinate  You also may urinate small amounts more often  · You have more trouble having or are not able to have a BM  · You have new or worse hot flashes  · You have any questions about your condition or care  When should I seek immediate care or call 911? · Your heart begins to race, and you feel faint  · You begin to pass large blood clots from your vagina  CARE AGREEMENT:   You have the right to help plan your care  Learn about your health condition and how it may be treated  Discuss treatment options with your caregivers to decide what care you want to receive  You always have the right to refuse treatment  The above information is an  only  It is not intended as medical advice for individual conditions or treatments  Talk to your doctor, nurse or pharmacist before following any medical regimen to see if it is safe and effective for you  © 2017 [employer] Information is for End User's use only and may not be sold, redistributed or otherwise used for commercial purposes  All illustrations and images included in CareNotes® are the copyrighted property of [employer]  or Charles Jimenez

## 2019-12-03 NOTE — PROGRESS NOTES
Assessment/Plan   Diagnoses and all orders for this visit:    Uterine leiomyoma, unspecified location    Female pelvic pain    Dense breast    Other orders  -     escitalopram (LEXAPRO) 5 mg tablet; Take 5 mg by mouth daily     1  Intermittent pelvic discomfort-pelvic ultrasound done today with no findings other than stable myomas  Patient was counseled about this and relieved about the lack of adnexal findings  She will call or return with any issues  She does note some of this with eating certain foods, so she will evaluate for possible gastrointestinal etiology as needed  2  Uterine fibroids-ultrasound demonstrated anterior fundal 1 cm, 0 9 cm lower urine segment, and anterior intramural 1 0 cm which are unchanged on ultrasound today compared with ultrasound November 2018  She will call or return with any issues  3  Prior history of postmenopausal bleeding-no current issues  4  Increased breast density-noted on most recent mammogram December 2018  She was again counseled about 3D mammogram and ABUS  This was ordered previously and repeat printed today given to the patient  We discussed other options including MRI and referral to breast surgeon, which she will defer  She may consider switching to [facility] to obtain individualized breast screening algorithm  5  Vaginal atrophy-no current concerns  6  Family history of breast cancer-mother diagnosed age 76 with recurrence age 66  No other family members were diagnosed  No genetic testing was done to my knowledge  To follow-up November 2019 for yearly exam or as needed  Subjective   Patient ID: Surya Brunson is a 59 y o  female  Vitals:    12/03/19 1459   BP: 130/86     Patient was seen today for follow-up visit  Please see assessment plan for details        The following portions of the patient's history were reviewed and updated as appropriate: allergies, current medications, past family history, past medical history, past social history, past surgical history and problem list   Past Medical History:   Diagnosis Date    Endometriosis     Female infertility     Fibroid     Recurrent pregnancy loss, antepartum condition or complication     Skin cancer      Past Surgical History:   Procedure Laterality Date    LAPAROSCOPY       OB History    Para Term  AB Living   1       1     SAB TAB Ectopic Multiple Live Births   1              # Outcome Date GA Lbr Marc/2nd Weight Sex Delivery Anes PTL Lv   1 SAB                Current Outpatient Medications:     Cholecalciferol (VITAMIN D3) 02370 units CAPS, Take 4,000 Units by mouth  , Disp: , Rfl:     dexamethasone (DECADRON) 20 mg/5 mL SOLN, 1 ML (4 MG TOTAL) BY IONTOPHORESIS ROUTE EVERY 6 (SIX) HOURS, Disp: , Rfl: 0    dexamethasone (DECADRON) 4 mg/mL, 1 mL (4 mg total) by Iontophoresis route every 6 (six) hours, Disp: 20 mL, Rfl: 0    escitalopram (LEXAPRO) 10 mg tablet, Take by mouth, Disp: , Rfl:     escitalopram (LEXAPRO) 5 mg tablet, Take 5 mg by mouth daily, Disp: , Rfl:     fexofenadine (ALLEGRA) 30 MG tablet, Take by mouth as needed  , Disp: , Rfl:     fluticasone (FLONASE) 50 mcg/act nasal spray, 2 sprays into each nostril, Disp: , Rfl:     Foot Care Products (SPENCO GEL HEEL CUP MED/LG) MISC, by Does not apply route continuous Wear in all of your shoes, all the time  , Disp: 1 each, Rfl: 0    hydrocortisone 2 5 % lotion, Apply topically, Disp: , Rfl:     ketoconazole (NIZORAL) 2 % cream, Apply topically 2 (two) times a day as needed, Disp: , Rfl:     Sulfacetamide Sodium-Sulfur 9 8-4 8 % LIQD, Apply topically, Disp: , Rfl:     TOBRADEX ophthalmic ointment, APPLY INTO BOTH EYES AT BEDTIME FOR 7 DAYS, Disp: , Rfl: 1    triamcinolone (KENALOG) 0 1 % ointment, Reported on 2017, Disp: , Rfl:     Current Facility-Administered Medications:     dexamethasone (DECADRON) injection 4 mg, 4 mg, Iontophoresis, Daily, Virgel Lefevre Lachman, MD  Allergies   Allergen Reactions  Iodinated Diagnostic Agents     Methylisothiazolinone Itching and Rash     Social History     Socioeconomic History    Marital status: /Civil Union     Spouse name: None    Number of children: None    Years of education: None    Highest education level: None   Occupational History    None   Social Needs    Financial resource strain: None    Food insecurity:     Worry: None     Inability: None    Transportation needs:     Medical: None     Non-medical: None   Tobacco Use    Smoking status: Never Smoker    Smokeless tobacco: Never Used   Substance and Sexual Activity    Alcohol use: No    Drug use: No    Sexual activity: None   Lifestyle    Physical activity:     Days per week: None     Minutes per session: None    Stress: None   Relationships    Social connections:     Talks on phone: None     Gets together: None     Attends Tenri[employer] service: None     Active member of club or organization: None     Attends meetings of clubs or organizations: None     Relationship status: None    Intimate partner violence:     Fear of current or ex partner: None     Emotionally abused: None     Physically abused: None     Forced sexual activity: None   Other Topics Concern    None   Social History Narrative    None     Family History   Problem Relation Age of Onset    Breast cancer Mother     Coronary artery disease Father     Hypertension Father        Review of Systems   Constitutional: Negative for chills, diaphoresis, fatigue and fever  Respiratory: Negative for apnea, cough, chest tightness, shortness of breath and wheezing  Cardiovascular: Negative for chest pain, palpitations and leg swelling  Gastrointestinal: Negative for abdominal distention, abdominal pain, anal bleeding, constipation, diarrhea, nausea, rectal pain and vomiting     Genitourinary: Negative for difficulty urinating, dyspareunia, dysuria, frequency, hematuria, menstrual problem, pelvic pain, urgency, vaginal bleeding, vaginal discharge and vaginal pain  Musculoskeletal: Negative for arthralgias, back pain and myalgias  Skin: Negative for color change and rash  Neurological: Negative for dizziness, syncope, light-headedness, numbness and headaches  Hematological: Negative for adenopathy  Does not bruise/bleed easily  Psychiatric/Behavioral: Negative for dysphoric mood and sleep disturbance  The patient is not nervous/anxious          Objective   Physical Exam    Objective      /86 (BP Location: Left arm, Patient Position: Sitting, Cuff Size: Standard)   Wt 67 1 kg (148 lb)   BMI 27 07 kg/m²     General:   alert and oriented, in no acute distress   Neck:    Breast:    Heart:    Lungs:    Abdomen: Nontender   Vulva:    Vagina:    Cervix:    Uterus:    Adnexa:    Rectum:     Psych:  Normal mood and affect   Skin:  Without obvious lesions   Eyes: symmetric, with normal movements and reactivity   Musculoskeletal:  Normal muscle tone and movements appreciated

## 2019-12-18 DIAGNOSIS — Z12.31 VISIT FOR SCREENING MAMMOGRAM: ICD-10-CM

## 2019-12-19 DIAGNOSIS — R92.2 DENSE BREAST TISSUE ON MAMMOGRAM: Primary | ICD-10-CM

## 2020-09-29 ENCOUNTER — TELEPHONE (OUTPATIENT)
Dept: OBGYN CLINIC | Facility: CLINIC | Age: 65
End: 2020-09-29

## 2020-09-30 ENCOUNTER — HOSPITAL ENCOUNTER (OUTPATIENT)
Dept: ULTRASOUND IMAGING | Facility: CLINIC | Age: 65
Discharge: HOME | End: 2020-09-30
Payer: COMMERCIAL

## 2020-09-30 VITALS — WEIGHT: 134 LBS | TEMPERATURE: 97.8 F | HEIGHT: 63 IN | BODY MASS INDEX: 23.74 KG/M2

## 2020-09-30 DIAGNOSIS — R92.2 DENSE BREAST TISSUE ON MAMMOGRAM: ICD-10-CM

## 2020-09-30 PROCEDURE — 76377 3D RENDER W/INTRP POSTPROCES: CPT

## 2020-09-30 PROCEDURE — 76641 ULTRASOUND BREAST COMPLETE: CPT

## 2020-10-02 ENCOUNTER — HOSPITAL ENCOUNTER (OUTPATIENT)
Dept: MAMMOGRAPHY | Facility: CLINIC | Age: 65
Discharge: HOME | End: 2020-10-02
Payer: COMMERCIAL

## 2020-10-02 ENCOUNTER — HOSPITAL ENCOUNTER (OUTPATIENT)
Dept: ULTRASOUND IMAGING | Facility: CLINIC | Age: 65
Discharge: HOME | End: 2020-10-02
Payer: COMMERCIAL

## 2020-10-02 VITALS — HEIGHT: 63 IN | TEMPERATURE: 97.1 F | BODY MASS INDEX: 23.74 KG/M2 | WEIGHT: 134 LBS

## 2020-10-02 VITALS — BODY MASS INDEX: 23.74 KG/M2 | HEIGHT: 63 IN | WEIGHT: 134 LBS

## 2020-10-02 DIAGNOSIS — R92.8 ABNORMAL ULTRASOUND OF BREAST: ICD-10-CM

## 2020-10-02 DIAGNOSIS — R93.89 ABNORMAL ULTRASOUND: ICD-10-CM

## 2020-10-02 PROCEDURE — 77065 DX MAMMO INCL CAD UNI: CPT

## 2020-10-02 PROCEDURE — G0279 TOMOSYNTHESIS, MAMMO: HCPCS

## 2020-10-02 PROCEDURE — 76642 ULTRASOUND BREAST LIMITED: CPT

## 2020-10-02 RX ORDER — LOSARTAN POTASSIUM 100 MG/1
100 TABLET ORAL DAILY
COMMUNITY

## 2020-10-09 ENCOUNTER — HOSPITAL ENCOUNTER (OUTPATIENT)
Dept: ULTRASOUND IMAGING | Facility: CLINIC | Age: 65
Discharge: HOME | End: 2020-10-09
Payer: COMMERCIAL

## 2020-10-09 ENCOUNTER — HOSPITAL ENCOUNTER (OUTPATIENT)
Dept: MAMMOGRAPHY | Facility: CLINIC | Age: 65
Discharge: HOME | End: 2020-10-09
Payer: COMMERCIAL

## 2020-10-09 VITALS — TEMPERATURE: 97.8 F | DIASTOLIC BLOOD PRESSURE: 90 MMHG | HEART RATE: 64 BPM | SYSTOLIC BLOOD PRESSURE: 148 MMHG

## 2020-10-09 DIAGNOSIS — R92.8 ABNORMAL MAMMOGRAM: ICD-10-CM

## 2020-10-09 PROCEDURE — 88305 TISSUE EXAM BY PATHOLOGIST: CPT | Performed by: PATHOLOGY

## 2020-10-09 PROCEDURE — 19083 BX BREAST 1ST LESION US IMAG: CPT

## 2020-10-09 RX ORDER — LIDOCAINE HYDROCHLORIDE 10 MG/ML
5 INJECTION, SOLUTION EPIDURAL; INFILTRATION; INTRACAUDAL; PERINEURAL ONCE
Status: COMPLETED | OUTPATIENT
Start: 2020-10-09 | End: 2020-10-09

## 2020-10-09 RX ADMIN — LIDOCAINE HYDROCHLORIDE 5 ML: 10 INJECTION, SOLUTION EPIDURAL; INFILTRATION; INTRACAUDAL; PERINEURAL at 09:02

## 2020-10-09 RX ADMIN — LIDOCAINE HYDROCHLORIDE 5 ML: 10 INJECTION, SOLUTION EPIDURAL; INFILTRATION; INTRACAUDAL; PERINEURAL at 08:59

## 2020-10-12 ENCOUNTER — TELEPHONE (OUTPATIENT)
Dept: MAMMOGRAPHY | Facility: CLINIC | Age: 65
End: 2020-10-12

## 2020-11-19 ENCOUNTER — ANNUAL EXAM (OUTPATIENT)
Dept: OBGYN CLINIC | Facility: CLINIC | Age: 65
End: 2020-11-19
Payer: COMMERCIAL

## 2020-11-19 VITALS
TEMPERATURE: 97.2 F | WEIGHT: 135 LBS | DIASTOLIC BLOOD PRESSURE: 76 MMHG | BODY MASS INDEX: 23.92 KG/M2 | SYSTOLIC BLOOD PRESSURE: 126 MMHG | HEIGHT: 63 IN

## 2020-11-19 DIAGNOSIS — R10.2 FEMALE PELVIC PAIN: ICD-10-CM

## 2020-11-19 DIAGNOSIS — Z12.31 ENCOUNTER FOR SCREENING MAMMOGRAM FOR MALIGNANT NEOPLASM OF BREAST: ICD-10-CM

## 2020-11-19 DIAGNOSIS — Z11.51 SCREENING FOR HPV (HUMAN PAPILLOMAVIRUS): ICD-10-CM

## 2020-11-19 DIAGNOSIS — Z12.4 SCREENING FOR CERVICAL CANCER: ICD-10-CM

## 2020-11-19 DIAGNOSIS — Z01.419 WOMEN'S ANNUAL ROUTINE GYNECOLOGICAL EXAMINATION: Primary | ICD-10-CM

## 2020-11-19 DIAGNOSIS — R92.2 DENSE BREAST: ICD-10-CM

## 2020-11-19 DIAGNOSIS — N95.2 ATROPHY OF VAGINA: ICD-10-CM

## 2020-11-19 DIAGNOSIS — D25.9 UTERINE LEIOMYOMA, UNSPECIFIED LOCATION: ICD-10-CM

## 2020-11-19 PROCEDURE — 99396 PREV VISIT EST AGE 40-64: CPT | Performed by: OBSTETRICS & GYNECOLOGY

## 2020-11-19 PROCEDURE — G0145 SCR C/V CYTO,THINLAYER,RESCR: HCPCS | Performed by: OBSTETRICS & GYNECOLOGY

## 2020-11-19 PROCEDURE — 87624 HPV HI-RISK TYP POOLED RSLT: CPT | Performed by: OBSTETRICS & GYNECOLOGY

## 2020-11-21 LAB
HPV HR 12 DNA CVX QL NAA+PROBE: NEGATIVE
HPV16 DNA CVX QL NAA+PROBE: NEGATIVE
HPV18 DNA CVX QL NAA+PROBE: NEGATIVE

## 2020-11-23 LAB
LAB AP GYN PRIMARY INTERPRETATION: NORMAL
Lab: NORMAL

## 2021-01-04 ENCOUNTER — OFFICE VISIT (OUTPATIENT)
Dept: OBGYN CLINIC | Facility: CLINIC | Age: 66
End: 2021-01-04
Payer: COMMERCIAL

## 2021-01-04 ENCOUNTER — ULTRASOUND (OUTPATIENT)
Dept: OBGYN CLINIC | Facility: CLINIC | Age: 66
End: 2021-01-04
Payer: COMMERCIAL

## 2021-01-04 VITALS — DIASTOLIC BLOOD PRESSURE: 78 MMHG | SYSTOLIC BLOOD PRESSURE: 128 MMHG | BODY MASS INDEX: 23.87 KG/M2 | WEIGHT: 132.6 LBS

## 2021-01-04 DIAGNOSIS — N95.2 ATROPHY OF VAGINA: ICD-10-CM

## 2021-01-04 DIAGNOSIS — D21.9 FIBROIDS: Primary | ICD-10-CM

## 2021-01-04 DIAGNOSIS — D25.9 UTERINE LEIOMYOMA, UNSPECIFIED LOCATION: ICD-10-CM

## 2021-01-04 DIAGNOSIS — R10.2 FEMALE PELVIC PAIN: Primary | ICD-10-CM

## 2021-01-04 PROCEDURE — 76830 TRANSVAGINAL US NON-OB: CPT | Performed by: OBSTETRICS & GYNECOLOGY

## 2021-01-04 PROCEDURE — 99213 OFFICE O/P EST LOW 20 MIN: CPT | Performed by: OBSTETRICS & GYNECOLOGY

## 2021-01-04 NOTE — PROGRESS NOTES
Assessment/Plan   Diagnoses and all orders for this visit:    Female pelvic pain    Uterine leiomyoma, unspecified location    Atrophy of vagina    1  Intermittent pelvic discomfort-exam was benign at last visit  Ultrasound notes stable findings without any adnexal masses or concerns  Patient was relieved to hear this  She will call with any issues  2  Previous history of postmenopausal bleeding-none noted for many years  Endometrium was 1 1 mm on ultrasound, which is good  She will call with any concerns  3  History uterine fibroids-noted to be 1 cm, 0 9 cm, and 1 cm on ultrasound today, stable from previous ultrasound December 2019  To call with any issues  4  History of vaginal atrophy-patient use lubrication with good results  She denies any dyspareunia  5  Breast- status post left breast biopsy under ultrasound guidance with benign findings  Patient has screening mammogram today at [facility], await results  She will call with any issues  6  Family history of breast cancer-mother with history of bilateral breast cancer  Previously counseled about genetic testing  To continue close follow-up as noted above  Follow-up November 2021 for yearly exam or as needed  Subjective   Patient ID: Emma King is a 60 y o  female  Vitals:    01/04/21 1136   BP: 128/78     Patient was seen today for ultrasound and follow-up visit  Please see assessment plan for details        The following portions of the patient's history were reviewed and updated as appropriate: allergies, current medications, past family history, past medical history, past social history, past surgical history and problem list   Past Medical History:   Diagnosis Date    Endometriosis     Female infertility     Fibrocystic breast     Fibroid     History of chemotherapy 03/2016    Hairy cell leukemia    Recurrent pregnancy loss, antepartum condition or complication     Skin cancer      Past Surgical History:   Procedure Laterality Date    BREAST BIOPSY Left 10/09/2020    2:00 7cmfn    LAPAROSCOPY      US GUIDED BREAST BIOPSY LEFT COMPLETE Left 10/9/2020     OB History    Para Term  AB Living   1   0   1     SAB TAB Ectopic Multiple Live Births   1              # Outcome Date GA Lbr Marc/2nd Weight Sex Delivery Anes PTL Lv   1 SAB                Current Outpatient Medications:     Cholecalciferol (VITAMIN D3) 56273 units CAPS, Take 4,000 Units by mouth  , Disp: , Rfl:     dexamethasone (DECADRON) 20 mg/5 mL SOLN, 1 ML (4 MG TOTAL) BY IONTOPHORESIS ROUTE EVERY 6 (SIX) HOURS, Disp: , Rfl: 0    dexamethasone (DECADRON) 4 mg/mL, 1 mL (4 mg total) by Iontophoresis route every 6 (six) hours, Disp: 20 mL, Rfl: 0    escitalopram (LEXAPRO) 10 mg tablet, Take by mouth, Disp: , Rfl:     escitalopram (LEXAPRO) 5 mg tablet, Take 5 mg by mouth daily, Disp: , Rfl:     fexofenadine (ALLEGRA) 30 MG tablet, Take by mouth as needed  , Disp: , Rfl:     fluticasone (FLONASE) 50 mcg/act nasal spray, 2 sprays into each nostril, Disp: , Rfl:     Foot Care Products (SPENCO GEL HEEL CUP MED/LG) MISC, by Does not apply route continuous Wear in all of your shoes, all the time  , Disp: 1 each, Rfl: 0    ketoconazole (NIZORAL) 2 % cream, Apply topically 2 (two) times a day as needed, Disp: , Rfl:     losartan (COZAAR) 100 MG tablet, Take 100 mg by mouth daily, Disp: , Rfl:     Sulfacetamide Sodium-Sulfur 9 8-4 8 % LIQD, Apply topically, Disp: , Rfl:     TOBRADEX ophthalmic ointment, APPLY INTO BOTH EYES AT BEDTIME FOR 7 DAYS, Disp: , Rfl: 1    triamcinolone (KENALOG) 0 1 % ointment, Reported on 2017, Disp: , Rfl:     Current Facility-Administered Medications:     dexamethasone (DECADRON) injection 4 mg, 4 mg, Iontophoresis, Daily, Albaroe Chars Lachman, MD  Allergies   Allergen Reactions    Iodinated Diagnostic Agents     Methylisothiazolinone Itching and Rash     Social History     Socioeconomic History    Marital status: /Civil [employer]     Spouse name: Not on file    Number of children: Not on file    Years of education: Not on file    Highest education level: Not on file   Occupational History    Not on file   Social Needs    Financial resource strain: Not on file    Food insecurity     Worry: Not on file     Inability: Not on file    Transportation needs     Medical: Not on file     Non-medical: Not on file   Tobacco Use    Smoking status: Never Smoker    Smokeless tobacco: Never Used   Substance and Sexual Activity    Alcohol use: No    Drug use: No    Sexual activity: Not on file   Lifestyle    Physical activity     Days per week: Not on file     Minutes per session: Not on file    Stress: Not on file   Relationships    Social connections     Talks on phone: Not on file     Gets together: Not on file     Attends Christianity service: Not on file     Active member of club or organization: Not on file     Attends meetings of clubs or organizations: Not on file     Relationship status: Not on file    Intimate partner violence     Fear of current or ex partner: Not on file     Emotionally abused: Not on file     Physically abused: Not on file     Forced sexual activity: Not on file   Other Topics Concern    Not on file   Social History Narrative    Not on file     Family History   Problem Relation Age of Onset    Breast cancer Mother 76        reoccurrence on other breast [de-identified]years old    Coronary artery disease Father     Hypertension Father     No Known Problems Sister     No Known Problems Paternal Aunt     No Known Problems Paternal Aunt        Review of Systems   Constitutional: Negative for chills, diaphoresis, fatigue and fever  Respiratory: Negative for apnea, cough, chest tightness, shortness of breath and wheezing  Cardiovascular: Negative for chest pain, palpitations and leg swelling     Gastrointestinal: Negative for abdominal distention, abdominal pain, anal bleeding, constipation, diarrhea, nausea, rectal pain and vomiting  Genitourinary: Negative for difficulty urinating, dyspareunia, dysuria, frequency, hematuria, menstrual problem, pelvic pain, urgency, vaginal bleeding, vaginal discharge and vaginal pain  Musculoskeletal: Negative for arthralgias, back pain and myalgias  Skin: Negative for color change and rash  Neurological: Negative for dizziness, syncope, light-headedness, numbness and headaches  Hematological: Negative for adenopathy  Does not bruise/bleed easily  Psychiatric/Behavioral: Negative for dysphoric mood and sleep disturbance  The patient is not nervous/anxious          Objective   Physical Exam    Objective      /78 (BP Location: Left arm, Patient Position: Sitting, Cuff Size: Adult)   Wt 60 1 kg (132 lb 9 6 oz)   BMI 23 87 kg/m²     General:   alert and oriented, in no acute distress   Neck:    Breast:    Heart:    Lungs:    Abdomen: Nontender   Vulva:    Vagina:    Cervix:    Uterus:    Adnexa:    Rectum:     Psych:  Normal mood and affect   Skin:  Without obvious lesions   Eyes: symmetric, with normal movements and reactivity   Musculoskeletal:  Normal muscle tone and movements appreciated

## 2021-01-04 NOTE — PATIENT INSTRUCTIONS
Uterine Fibroids   WHAT YOU NEED TO KNOW:   What are uterine fibroids? Uterine fibroids are growths found inside your uterus  Uterine fibroids also may be called tumors or leiomyomas  Uterine fibroids often appear in groups, or you may have only one  They can be small or large, and they can grow  They are almost always benign (not cancer) and likely will not spread to other parts of your body  They may grow when you are pregnant and shrink after you no longer have a monthly period  What increases my risk for uterine fibroids? The cause of uterine fibroids is not clear  Ask your healthcare provider about these and other risk factors for uterine fibroids:  · A family history of uterine fibroids    · Too many female hormones, especially estrogen    · Menstrual periods starting before age 15    · Too much body weight    · Not having children    · Drinking alcohol    What are the signs and symptoms of uterine fibroids? You may have no signs or symptoms  Symptoms depend on the size, type, and number of fibroids you have  Symptoms also depend on where the fibroids are inside your uterus:  · Heavy or painful menstrual bleeding    · Pelvic pressure and pain    · Increased pelvic pain during sex    · Constipation or pain when you have a bowel movement    · Need to urinate often    How are uterine fibroids diagnosed? Your healthcare provider will examine you and ask about your symptoms  Tell the provider if any women if your family have had uterine fibroids  You may also need any of the following:  · A pelvic exam  is also called an internal or vaginal exam  During a pelvic exam, your healthcare provider gently puts a speculum into your vagina  A speculum is a tool that opens your vagina  This lets your healthcare provider see your cervix (bottom part of your uterus)  With gloved hands, your healthcare provider will check the size and shape of your uterus and ovaries       · A vaginal ultrasound  is used to see inside your uterus and to check your ovaries  During this test, your healthcare provider places a small wand in your vagina  Sound waves from the wand show pictures of the uterus and ovaries  · A biopsy  is a tissue sample of a fibroid that your healthcare provider takes from your uterus for testing  How are uterine fibroids treated? You may not need treatment for your fibroids if you do not have symptoms  The following treatments may shrink your fibroids and help your pain:  · Hormones  may help shrink your fibroids  · Contraceptives  help prevent pregnancy  They also may help shrink your fibroids  · NSAIDs  help decrease swelling and pain or fever  This medicine is available with or without a doctor's order  NSAIDs can cause stomach bleeding or kidney problems in certain people  If you take blood thinner medicine, always ask your healthcare provider if NSAIDs are safe for you  Always read the medicine label and follow directions  · Surgery  may be used to remove your uterine fibroids  Surgery may instead be used to block or slow the flow of blood to the fibroid  This may make your fibroids shrink or disappear  Surgery called a hysterectomy may be needed if your fibroids are severe  For this surgery, your healthcare provider removes your entire uterus  After this surgery, you will no longer be able to have children  What can I do to prevent uterine fibroids? · Maintain a healthy weight  Extra weight can cause fibroids to grow  Talk to your healthcare provider about a healthy weight for you  He or she can help you create a healthy weight loss plan if you are overweight  · Eat a variety of healthy foods  Healthy foods include fruits, vegetables, lean meats, fish, low-fat dairy foods, cooked beans, and whole-grain breads and cereals  Fruits and vegetables are especially important for helping lower the risk for fibroids   Your healthcare provider or a dietitian can help you create a healthy meal plan     · Limit or do not drink alcohol as directed  Alcohol can increase your risk for fibroids  A drink of alcohol is 12 ounces of beer, 1½ ounces of liquor, or 5 ounces of wine  Ask your healthcare provider for information if you need help to quit drinking alcohol  When should I seek immediate care? · Your heart begins to race, and you feel faint  · You begin to pass large blood clots from your vagina  When should I contact my healthcare provider? · Your symptoms, such as heavy bleeding, pain, or pelvic pressure, worsen  · You feel weak and are more tired than usual     · You do not feel like your bladder is empty after you urinate  You also may urinate small amounts more often  · You have questions or concerns about your condition or care  CARE AGREEMENT:   You have the right to help plan your care  Learn about your health condition and how it may be treated  Discuss treatment options with your healthcare providers to decide what care you want to receive  You always have the right to refuse treatment  The above information is an  only  It is not intended as medical advice for individual conditions or treatments  Talk to your doctor, nurse or pharmacist before following any medical regimen to see if it is safe and effective for you  © Copyright [address] Information is for End User's use only and may not be sold, redistributed or otherwise used for commercial purposes   All illustrations and images included in CareNotes® are the copyrighted property of [employer]  or [address] [place] [address]

## 2021-01-04 NOTE — PROGRESS NOTES
AMB US Pelvic Non OB    Date/Time: 1/4/2021 10:31 AM  Performed by: Wilberto Loyd  Authorized by: Doug Horton MD   Universal Protocol:  Patient identity confirmed: verbally with patient      Procedure details:     Indications: leiomyoma      Technique:  Transvaginal US, Non-OB    Position: lithotomy exam    Uterine findings:     Length (cm): 5 25    Height (cm):  2 78    Width (cm):  3 9    Endometrial stripe: identified      Endometrium thickness (mm):  1 1  Left ovary findings:     Left ovary:  Visualized    Length (cm): 2 04    Height (cm): 1 19    Width (cm): 1 62  Right ovary findings:     Right ovary:  Visualized    Length (cm): 1 51    Height (cm): 0 96    Width (cm): 1 39  Other findings:     Free pelvic fluid: not identified      Free peritoneal fluid: not identified    Post-Procedure Details:     Impression:  Retroverted uterus demonstrates stable fibroids  Largest are anterior fundal 1 0cm, lower uterine segment 0 9cm, and anterior intramural 1 0cm  Bilateral ovaries appear within normal limits  No free fluid  Tolerance: Tolerated well, no immediate complications    Complications: no complications    Additional Procedure Comments:      [employer] F8 E8C-RS transvaginal transducer Serial # [id number] was used to perform the examination today and subsequently followed with high level disinfection utilizing Trophon EPR procedure  Ultrasound performed at:     [address] [address]  Phone:  [phone]  Fax:  [phone]

## 2021-03-10 DIAGNOSIS — Z23 ENCOUNTER FOR IMMUNIZATION: ICD-10-CM

## 2021-08-18 ENCOUNTER — PATIENT MESSAGE (OUTPATIENT)
Dept: OBGYN CLINIC | Facility: CLINIC | Age: 66
End: 2021-08-18

## 2021-08-19 NOTE — TELEPHONE ENCOUNTER
We can do it - We should scan an actual copy of the card - I'll see if she can send a pic of it  See me and I will show you how

## 2021-12-13 ENCOUNTER — ANNUAL EXAM (OUTPATIENT)
Dept: OBGYN CLINIC | Facility: CLINIC | Age: 66
End: 2021-12-13
Payer: MEDICARE

## 2021-12-13 VITALS
HEIGHT: 62 IN | DIASTOLIC BLOOD PRESSURE: 72 MMHG | SYSTOLIC BLOOD PRESSURE: 118 MMHG | WEIGHT: 140 LBS | BODY MASS INDEX: 25.76 KG/M2

## 2021-12-13 DIAGNOSIS — D25.9 UTERINE LEIOMYOMA, UNSPECIFIED LOCATION: ICD-10-CM

## 2021-12-13 DIAGNOSIS — Z01.419 WOMEN'S ANNUAL ROUTINE GYNECOLOGICAL EXAMINATION: Primary | ICD-10-CM

## 2021-12-13 DIAGNOSIS — Z12.31 ENCOUNTER FOR SCREENING MAMMOGRAM FOR MALIGNANT NEOPLASM OF BREAST: ICD-10-CM

## 2021-12-13 DIAGNOSIS — N95.2 ATROPHY OF VAGINA: ICD-10-CM

## 2021-12-13 PROCEDURE — G0101 CA SCREEN;PELVIC/BREAST EXAM: HCPCS | Performed by: OBSTETRICS & GYNECOLOGY

## 2021-12-13 RX ORDER — ERGOCALCIFEROL 1.25 MG/1
CAPSULE ORAL
COMMUNITY
Start: 2021-11-09

## 2021-12-13 RX ORDER — CYCLOBENZAPRINE HCL 10 MG
TABLET ORAL
COMMUNITY

## 2021-12-13 RX ORDER — CYANOCOBALAMIN (VITAMIN B-12) 1000 MCG
TABLET, EXTENDED RELEASE ORAL
COMMUNITY
Start: 2021-02-11

## 2022-01-04 ENCOUNTER — ULTRASOUND (OUTPATIENT)
Dept: OBGYN CLINIC | Facility: CLINIC | Age: 67
End: 2022-01-04
Payer: MEDICARE

## 2022-01-04 ENCOUNTER — OFFICE VISIT (OUTPATIENT)
Dept: OBGYN CLINIC | Facility: CLINIC | Age: 67
End: 2022-01-04
Payer: MEDICARE

## 2022-01-04 VITALS — DIASTOLIC BLOOD PRESSURE: 86 MMHG | WEIGHT: 141 LBS | SYSTOLIC BLOOD PRESSURE: 130 MMHG | BODY MASS INDEX: 25.79 KG/M2

## 2022-01-04 DIAGNOSIS — R10.2 FEMALE PELVIC PAIN: Primary | ICD-10-CM

## 2022-01-04 DIAGNOSIS — D25.9 UTERINE LEIOMYOMA, UNSPECIFIED LOCATION: ICD-10-CM

## 2022-01-04 DIAGNOSIS — N95.2 ATROPHY OF VAGINA: ICD-10-CM

## 2022-01-04 PROCEDURE — 99213 OFFICE O/P EST LOW 20 MIN: CPT | Performed by: OBSTETRICS & GYNECOLOGY

## 2022-01-04 PROCEDURE — 76830 TRANSVAGINAL US NON-OB: CPT | Performed by: OBSTETRICS & GYNECOLOGY

## 2022-01-04 NOTE — PROGRESS NOTES
Assessment/Plan   Diagnoses and all orders for this visit:    Female pelvic pain    Uterine leiomyoma, unspecified location    Atrophy of vagina    1  Intermittent pelvic discomfort-ultrasound notable for no adnexal findings and stable myomas  Patient was relieved to hear this  She will call or return with any significant pelvic pain  2  Uterine myomas-continues to be 1 cm, 0 9 cm, and 1 cm on ultrasound today, unchanged from prior study  Patient was counseled about this  No intervention is recommended  3  Prior history of postmenopausal bleeding-noted many years ago  Endometrium is 1 mm on ultrasound today  She will call with any bleeding or dark discharge  4  Vaginal atrophy-stable on exam at previous visit  5  History Dense breast changes-most recent mammogram demonstrated normal breast density  She does have mammogram planned for tomorrow, did switch to [facility] from [facility]  Suggested she get her old records so they can compare  6  Family history of breast cancer-mother with bilateral breast cancer  Had review genetic testing previously  To continue imaging as noted above  7  Other-support given during [place] pandemic  Follow-up December 2022 for yearly exam with ultrasound or as needed  Subjective   Patient ID: Rosalind Dunne is a 73 y o  female  Vitals:    01/04/22 1432   BP: 130/86     Patient was seen today for ultrasound and office visit with me  Please see assessment plan and ultrasound note for details        The following portions of the patient's history were reviewed and updated as appropriate: allergies, current medications, past family history, past medical history, past social history, past surgical history and problem list   Past Medical History:   Diagnosis Date    Endometriosis     Female infertility     Fibrocystic breast     Fibroid     History of chemotherapy 03/2016    Hairy cell leukemia    Recurrent pregnancy loss, antepartum condition or complication     Skin cancer      Past Surgical History:   Procedure Laterality Date    BREAST BIOPSY Left 10/09/2020    2:00 7cmfn    LAPAROSCOPY      US GUIDED BREAST BIOPSY LEFT COMPLETE Left 10/9/2020     OB History    Para Term  AB Living   1   0   1     SAB IAB Ectopic Multiple Live Births   1              # Outcome Date GA Lbr Marc/2nd Weight Sex Delivery Anes PTL Lv   1 SAB                Current Outpatient Medications:     Calcium Citrate-Vitamin D 315-250 MG-UNIT TABS, , Disp: , Rfl:     cyclobenzaprine (FLEXERIL) 10 mg tablet, cyclobenzaprine 10 mg tablet  TAKE 1 TABLET BY MOUTH THREE TIMES A DAY AS NEEDED, Disp: , Rfl:     ergocalciferol (VITAMIN D2) 50,000 units, , Disp: , Rfl:     escitalopram (LEXAPRO) 10 mg tablet, Take by mouth, Disp: , Rfl:     fluticasone (FLONASE) 50 mcg/act nasal spray, 2 sprays into each nostril, Disp: , Rfl:     ketoconazole (NIZORAL) 2 % cream, Apply topically 2 (two) times a day as needed, Disp: , Rfl:     losartan (COZAAR) 100 MG tablet, Take 100 mg by mouth daily, Disp: , Rfl:     Current Facility-Administered Medications:     dexamethasone (DECADRON) injection 4 mg, 4 mg, Iontophoresis, Daily, Sherral Mires Lachman, MD  Allergies   Allergen Reactions    Iodinated Diagnostic Agents     Methylisothiazolinone Itching and Rash     Social History     Socioeconomic History    Marital status: /Civil Union     Spouse name: Not on file    Number of children: Not on file    Years of education: Not on file    Highest education level: Not on file   Occupational History    Not on file   Tobacco Use    Smoking status: Never Smoker    Smokeless tobacco: Never Used   Vaping Use    Vaping Use: Never used   Substance and Sexual Activity    Alcohol use:  Yes    Drug use: No    Sexual activity: Yes     Partners: Male   Other Topics Concern    Not on file   Social History Narrative    Not on file     Social Determinants of Health     Financial Resource Strain: Not on file   Food Insecurity: Not on file   Transportation Needs: Not on file   Physical Activity: Not on file   Stress: Not on file   Social Connections: Not on file   Intimate Partner Violence: Not on file   Housing Stability: Not on file     Family History   Problem Relation Age of Onset    Breast cancer Mother 76        reoccurrence on other breast [de-identified]years old    Coronary artery disease Father     Hypertension Father     No Known Problems Sister     No Known Problems Paternal Aunt     No Known Problems Paternal Aunt        Review of Systems   Constitutional: Negative for chills, diaphoresis, fatigue and fever  Respiratory: Negative for apnea, cough, chest tightness, shortness of breath and wheezing  Cardiovascular: Negative for chest pain, palpitations and leg swelling  Gastrointestinal: Negative for abdominal distention, abdominal pain, anal bleeding, constipation, diarrhea, nausea, rectal pain and vomiting  Genitourinary: Negative for difficulty urinating, dyspareunia, dysuria, frequency, hematuria, menstrual problem, pelvic pain, urgency, vaginal bleeding, vaginal discharge and vaginal pain  Musculoskeletal: Negative for arthralgias, back pain and myalgias  Skin: Negative for color change and rash  Neurological: Negative for dizziness, syncope, light-headedness, numbness and headaches  Hematological: Negative for adenopathy  Does not bruise/bleed easily  Psychiatric/Behavioral: Negative for dysphoric mood and sleep disturbance  The patient is not nervous/anxious          Objective   Physical Exam  OBGyn Exam     Objective      /86 (BP Location: Left arm, Patient Position: Sitting, Cuff Size: Adult)   Wt 64 kg (141 lb)   BMI 25 79 kg/m²     General:   alert and oriented, in no acute distress   Neck:    Breast:    Heart:    Lungs:    Abdomen: Nontender   Vulva:    Vagina:    Cervix:    Uterus:    Adnexa:    Rectum:     Psych:  Normal mood and affect   Skin:  Without obvious lesions   Eyes: symmetric, with normal movements and reactivity   Musculoskeletal:  Normal muscle tone and movements appreciated

## 2022-01-04 NOTE — PROGRESS NOTES
AMB US Pelvic Non OB    Date/Time: 1/4/2022 1:42 PM  Performed by: Maldonado Patel  Authorized by: Pilo Javier MD     Procedure details:     Technique:  Transvaginal US, Non-OB    Position: lithotomy exam    Uterine findings:     Length (cm): 4 84    Height (cm):  2 51    Width (cm):  3 75    Endometrial stripe: identified      Endometrium thickness (mm):  1  Left ovary findings:     Left ovary:  Visualized    Length (cm): 1 77    Height (cm): 0 93    Width (cm): 1 77  Right ovary findings:     Right ovary:  Visualized    Length (cm): 1 27    Height (cm): 0 73    Width (cm): 0 89  Other findings:     Free pelvic fluid: not identified      Free peritoneal fluid: not identified    Post-Procedure Details:     Impression:  Retroverted uterus again demonstrates stable fibroids  Largest are anterior fundal 1 0cm, lower uterine segment 0 9cm, and anterior intramural 1 0cm  Bilateral ovaries appear within normal limits  No free fluid  Tolerance: Tolerated well, no immediate complications    Complications: no complications    Additional Procedure Comments:      [employer] F8 E8C-RS transvaginal transducer Serial # [id number] was used to perform the examination today and subsequently followed with high level disinfection utilizing Trophon EPR procedure  Ultrasound performed at:     [address]  [address] [facility], [facility] [address] [facility]  Phone:  [phone]  Fax:  [phone]

## 2022-01-04 NOTE — PATIENT INSTRUCTIONS
Pelvic Pain in Women   WHAT YOU NEED TO KNOW:   What do I need to know about pelvic pain? You may have pain on one or both sides of your pelvis  Pelvic pain may occur with certain body positions or activities, such as when you have sex or a bowel movement  It may worsen during your monthly period or after you sit or stand for a long time  Chronic pelvic pain is pain that continues for longer than 6 months  What causes pelvic pain in women? · Gynecologic conditions , such as pelvic inflammatory disease (PID), endometriosis, or uterine fibroids    · Bowel and bladder conditions , such as irritable bowel syndrome, bladder inflammation, or tumors     · Muscle and nerve conditions , such as swelling or weakness of your pelvic muscles, or damage to the nerves of your pelvic area (neuropathy)    · Psychological issues as a result of physical or sexual abuse, or drug abuse    How is pelvic pain treated? · Pain medicine  may be given in pills, injections, or creams to relieve your pain  · Hormones  may be given if your pain gets worse with your menstrual cycle  · Antibiotics  may be given if your pain is caused by infection  · Surgery  may be done if other treatments do not relieve your pain  How can I manage my pelvic pain? · Keep a pain diary  Write down when your pain happens, how severe it is, and any other symptoms you have with your pain  A diary will help you keep track of pain cycles  It may also help your healthcare provider find out what is causing your pain  · Learn ways to relax  Deep breathing, meditation, and relaxation techniques can help decrease your pain  When you are tense, your pain may increase  · Change the foods you eat if you have irritable bowel syndrome  Ask your healthcare provider about the best foods for you  Call 911 for any of the following:   · You have severe chest pain and sudden trouble breathing  When should I seek immediate care?    · You have heavy or unusual vaginal bleeding, and you feel lightheaded or faint  When should I contact my healthcare provider? · You have pelvic pain that does not go away after you take pain medicine  · You develop new symptoms or your symptoms are worse than before  · You have questions or concerns about your condition or care  CARE AGREEMENT:   You have the right to help plan your care  Learn about your health condition and how it may be treated  Discuss treatment options with your healthcare providers to decide what care you want to receive  You always have the right to refuse treatment  The above information is an  only  It is not intended as medical advice for individual conditions or treatments  Talk to your doctor, nurse or pharmacist before following any medical regimen to see if it is safe and effective for you  © Copyright [employer] 2021 Information is for End User's use only and may not be sold, redistributed or otherwise used for commercial purposes  All illustrations and images included in CareNotes® are the copyrighted property of A D A M , Inc  or [facility] Ramesh Aragon   Uterine Fibroids   WHAT YOU NEED TO KNOW:   What are uterine fibroids? Uterine fibroids are growths found inside your uterus  Uterine fibroids also may be called tumors or leiomyomas  Uterine fibroids often appear in groups, or you may have only one  They can be small or large, and they can grow  They are almost always benign (not cancer) and likely will not spread to other parts of your body  They may grow when you are pregnant and shrink after you no longer have a monthly period  What increases my risk for uterine fibroids? The cause of uterine fibroids is not clear   Ask your healthcare provider about these and other risk factors for uterine fibroids:  · A family history of uterine fibroids    · Too many female hormones, especially estrogen    · Menstrual periods starting before age 15    · Too much body weight    · Not having children    · Drinking alcohol    What are the signs and symptoms of uterine fibroids? You may have no signs or symptoms  Symptoms depend on the size, type, and number of fibroids you have  Symptoms also depend on where the fibroids are inside your uterus:  · Heavy or painful menstrual bleeding    · Pelvic pressure and pain    · Increased pelvic pain during sex    · Constipation or pain when you have a bowel movement    · Need to urinate often    How are uterine fibroids diagnosed? Your healthcare provider will examine you and ask about your symptoms  Tell the provider if any women if your family have had uterine fibroids  You may also need any of the following:  · A pelvic exam  is also called an internal or vaginal exam  During a pelvic exam, your healthcare provider gently puts a speculum into your vagina  A speculum is a tool that opens your vagina  This lets your healthcare provider see your cervix (bottom part of your uterus)  With gloved hands, your healthcare provider will check the size and shape of your uterus and ovaries  · A vaginal ultrasound  is used to see inside your uterus and to check your ovaries  During this test, your healthcare provider places a small wand in your vagina  Sound waves from the wand show pictures of the uterus and ovaries  · A biopsy  is a tissue sample of a fibroid that your healthcare provider takes from your uterus for testing  How are uterine fibroids treated? You may not need treatment for your fibroids if you do not have symptoms  The following treatments may shrink your fibroids and help your pain:  · Hormones  may help shrink your fibroids  · Contraceptives  help prevent pregnancy  They also may help shrink your fibroids  · NSAIDs  help decrease swelling and pain or fever  This medicine is available with or without a doctor's order  NSAIDs can cause stomach bleeding or kidney problems in certain people   If you take blood thinner medicine, always ask your healthcare provider if NSAIDs are safe for you  Always read the medicine label and follow directions  · Surgery  may be used to remove your uterine fibroids  Surgery may instead be used to block or slow the flow of blood to the fibroid  This may make your fibroids shrink or disappear  Surgery called a hysterectomy may be needed if your fibroids are severe  For this surgery, your healthcare provider removes your entire uterus  After this surgery, you will no longer be able to have children  What can I do to prevent uterine fibroids? · Maintain a healthy weight  Extra weight can cause fibroids to grow  Talk to your healthcare provider about a healthy weight for you  He or she can help you create a healthy weight loss plan if you are overweight  · Eat a variety of healthy foods  Healthy foods include fruits, vegetables, lean meats, fish, low-fat dairy foods, cooked beans, and whole-grain breads and cereals  Fruits and vegetables are especially important for helping lower the risk for fibroids  Your healthcare provider or a dietitian can help you create a healthy meal plan  · Limit or do not drink alcohol as directed  Alcohol can increase your risk for fibroids  A drink of alcohol is 12 ounces of beer, 1½ ounces of liquor, or 5 ounces of wine  Ask your healthcare provider for information if you need help to quit drinking alcohol  When should I seek immediate care? · Your heart begins to race, and you feel faint  · You begin to pass large blood clots from your vagina  When should I call my doctor or gynecologist?   · Your symptoms, such as heavy bleeding, pain, or pelvic pressure, worsen  · You feel weak and are more tired than usual     · You do not feel like your bladder is empty after you urinate  You also may urinate small amounts more often  · You have questions or concerns about your condition or care      CARE AGREEMENT:   You have the right to help plan your care  Learn about your health condition and how it may be treated  Discuss treatment options with your healthcare providers to decide what care you want to receive  You always have the right to refuse treatment  The above information is an  only  It is not intended as medical advice for individual conditions or treatments  Talk to your doctor, nurse or pharmacist before following any medical regimen to see if it is safe and effective for you  © Copyright [employer] 2021 Information is for End User's use only and may not be sold, redistributed or otherwise used for commercial purposes   All illustrations and images included in CareNotes® are the copyrighted property of [employer] , [employer]  or [address] [place] [address] [employer]

## 2022-01-05 ENCOUNTER — HOSPITAL ENCOUNTER (OUTPATIENT)
Dept: MAMMOGRAPHY | Facility: MEDICAL CENTER | Age: 67
Discharge: HOME | End: 2022-01-05
Payer: MEDICARE

## 2022-01-05 VITALS — BODY MASS INDEX: 24.98 KG/M2 | WEIGHT: 141 LBS | HEIGHT: 63 IN

## 2022-01-05 DIAGNOSIS — Z12.31 ENCOUNTER FOR SCREENING MAMMOGRAM FOR MALIGNANT NEOPLASM OF BREAST: ICD-10-CM

## 2022-01-05 PROCEDURE — 77063 BREAST TOMOSYNTHESIS BI: CPT

## 2022-01-05 PROCEDURE — 77067 SCR MAMMO BI INCL CAD: CPT

## 2022-01-10 DIAGNOSIS — R92.2 DENSE BREAST TISSUE ON MAMMOGRAM: Primary | ICD-10-CM

## 2022-04-25 ENCOUNTER — EVALUATION (OUTPATIENT)
Dept: PHYSICAL THERAPY | Facility: CLINIC | Age: 67
End: 2022-04-25
Payer: MEDICARE

## 2022-04-25 DIAGNOSIS — G89.29 CHRONIC THORACIC SPINE PAIN: Primary | ICD-10-CM

## 2022-04-25 DIAGNOSIS — M54.6 CHRONIC THORACIC SPINE PAIN: Primary | ICD-10-CM

## 2022-04-25 PROCEDURE — 97161 PT EVAL LOW COMPLEX 20 MIN: CPT | Performed by: PHYSICAL THERAPIST

## 2022-04-25 NOTE — PROGRESS NOTES
PT Evaluation     Today's date: 2022  Patient name: Blake Hodge  : 1955  MRN: [id number]  Referring provider: Jessica Jesus MD  Dx:   Encounter Diagnosis     ICD-10-CM    1  Chronic thoracic spine pain  M54 6     G89 29                   Assessment  Assessment details: Pt is a 73 y o  female who presents to outpatient PT with pain, decreased rom, decreased strength and decreased functional mobility  She will benefit from skilled PT to address these deficits in order to achieve her goals and maximize her functional mobility  Goals  Short Term Goals: Independent performance of initial hep  Decrease pain 2 points on VAS      Long Term Goals: Independent performance of comprehensive hep  Work performance is returned to max level of function  Performance of IADL's is returned to max level of function  Performance in recreational activities is improved to max level of function  Able to stand >45 min with good posture and min thoracic spine pain    Plan  Planned therapy interventions: abdominal trunk stabilization, IADL retraining, joint mobilization, manual therapy, massage, strengthening, stretching, therapeutic activities, therapeutic exercise, therapeutic training, transfer training and home exercise program  Frequency: 2x week  Duration in weeks: 4        Subjective Evaluation    History of Present Illness  Mechanism of injury: Mechanism of injury: Pt reports that in  she was reaching above her washing machine and severe pain in her thoracic spine  Reports that that sharp pain reduced but she continued to sig achy pain  Was being treated by PT at that time with min pain reduction  Reports that had been going to a chiropractor for approx 1 year but feels she is no longer improving  [facility]anna Brew[facility] MRI revealed thoracic HNP   Reports increased pain with flexion based movement patterns, has pain reduction when laying down or sitting down Reports that she has to be aware of her posture to reduce symptoms    Reports that had been going to a chiropractor for approx 1 year  Pain  Current pain ratin  At worst pain ratin    Patient Goals  Patient goals for therapy: decreased pain, increased motion, increased strength and independence with ADLs/IADLs          Objective     thoracic spine:    ROM:   Flexion: min limited   Extension: mod limited   Right Rotation: mod limited   Left Rotation: min limited         Poor control of abdominals noted with TaA  Fair control of deep cervical flexors noted with chin tuck  Hypomobility noted upper t-spine with spring testing  Straight Leg Raise: neg  Decreased flexibility: B/L hip flexors  [place]'s sign: neg    scap retractor strength    UT:   MT:  4+/5  LT:  3/  SA 4-/5    TTP L thoracic paraspinals                 Precautions: anxiety, chronic cervical spine pain    Manuals             Laser L thoracic paraspinals                                                    Neuro Re-Ed             Chin tuck             TaA                                                                              Ther Ex             Y's             d1 ext             Foam roller series (bear hugs, snow angle, punches, backstroke)             Doorway pect stretch             scap retraction with tb ER             Low rows                                       Ther Activity             ube retro                          Gait Training                                       Modalities

## 2022-04-25 NOTE — LETTER
2022    Yeny Aldridge, [address] [facility]  [address] [facility] [address]    Patient: Sohan Hatfield   YOB: 1955   Date of Visit: 2022     Encounter Diagnosis     ICD-10-CM    1  Chronic thoracic spine pain  M54 6     G89 29        Dear Dr Chacha Forrest: Thank you for your recent referral of Sohan Hatfield  Please review the attached evaluation summary from Orly's recent visit  Please verify that you agree with the plan of care by signing the attached order  If you have any questions or concerns, please do not hesitate to call  I sincerely appreciate the opportunity to share in the care of one of your patients and hope to have another opportunity to work with you in the near future  Sincerely,    Luz Marina Charles, PT      Referring Provider:      I certify that I have read the below Plan of Care and certify the need for these services furnished under this plan of treatment while under my care  Yeny Aldridge MD  [address] [place] [id number]  Via Fax: [phone]          PT Evaluation     Today's date: 2022  Patient name: Sohan Hatfield  : 1955  MRN: [id number]  Referring provider: Rodney Jones MD  Dx:   Encounter Diagnosis     ICD-10-CM    1  Chronic thoracic spine pain  M54 6     G89 29                   Assessment  Assessment details: Pt is a 73 y o  female who presents to outpatient PT with pain, decreased rom, decreased strength and decreased functional mobility  She will benefit from skilled PT to address these deficits in order to achieve her goals and maximize her functional mobility  Goals  Short Term Goals: Independent performance of initial hep  Decrease pain 2 points on VAS      Long Term Goals:   Independent performance of comprehensive hep  Work performance is returned to max level of function  Performance of IADL's is returned to max level of function  Performance in recreational activities is improved to max level of function  Able to stand >45 min with good posture and min thoracic spine pain    Plan  Planned therapy interventions: abdominal trunk stabilization, IADL retraining, joint mobilization, manual therapy, massage, strengthening, stretching, therapeutic activities, therapeutic exercise, therapeutic training, transfer training and home exercise program  Frequency: 2x week  Duration in weeks: 4        Subjective Evaluation    History of Present Illness  Mechanism of injury: Mechanism of injury: Pt reports that in  she was reaching above her washing machine and severe pain in her thoracic spine  Reports that that sharp pain reduced but she continued to sig achy pain  Was being treated by PT at that time with min pain reduction  Reports that had been going to a chiropractor for approx 1 year but feels she is no longer improving  Geni Serum MRI revealed thoracic HNP  Reports increased pain with flexion based movement patterns, has pain reduction when laying down or sitting down Reports that she has to be aware of her posture to reduce symptoms    Reports that had been going to a chiropractor for approx 1 year  Pain  Current pain ratin  At worst pain ratin    Patient Goals  Patient goals for therapy: decreased pain, increased motion, increased strength and independence with ADLs/IADLs          Objective     thoracic spine:    ROM:   Flexion: min limited   Extension: mod limited   Right Rotation: mod limited   Left Rotation: min limited         Poor control of abdominals noted with TaA  Fair control of deep cervical flexors noted with chin tuck  Hypomobility noted upper t-spine with spring testing  Straight Leg Raise: neg  Decreased flexibility: B/L hip flexors  Anahi's sign: neg    scap retractor strength    UT: 5/5  MT:  4+/5  LT:  3/5  SA 4-/5    TTP L thoracic paraspinals                 Precautions: anxiety, chronic cervical spine pain    Manuals             Laser L thoracic paraspinals                                                    Neuro Re-Ed             Chin tuck             TaA                                                                              Ther Ex             Y's             d1 ext             Foam roller series (bear hugs, snow angle, punches, backstroke)             Doorway pect stretch             scap retraction with tb ER             Low rows                                       Ther Activity             ube retro                          Gait Training                                       Modalities

## 2022-04-27 ENCOUNTER — OFFICE VISIT (OUTPATIENT)
Dept: PHYSICAL THERAPY | Facility: CLINIC | Age: 67
End: 2022-04-27
Payer: MEDICARE

## 2022-04-27 DIAGNOSIS — M54.6 CHRONIC THORACIC SPINE PAIN: Primary | ICD-10-CM

## 2022-04-27 DIAGNOSIS — G89.29 CHRONIC THORACIC SPINE PAIN: Primary | ICD-10-CM

## 2022-04-27 PROCEDURE — 97140 MANUAL THERAPY 1/> REGIONS: CPT

## 2022-04-27 PROCEDURE — 97110 THERAPEUTIC EXERCISES: CPT

## 2022-04-27 NOTE — PROGRESS NOTES
Daily Note     Today's date: 2022  Patient name: Gioia Apgar  : 1955  MRN: [id number]  Referring provider: Dipak Vale MD  Dx:   Encounter Diagnosis     ICD-10-CM    1  Chronic thoracic spine pain  M54 6     G89 29                   Subjective: Pt reports she has some discomfort when performing seated thoracic lumbar extension at home  Notes feeling okay today  Objective: See treatment diary below      Assessment: Reviewed/performed HEP  New exercises performed w/o complaint  Added laser, f/b STM to L thoracic paraspinals, responded well to same  Relief after tx  Will monitor  Pt would benefit from cont PT  Plan: Cont per POC       Precautions: anxiety, chronic cervical spine pain    Manuals            Laser L thoracic paraspinals  MO           STM prn  10' MO                                     Neuro Re-Ed             Chin tuck  Supine  5"x20           TaA  5"x10                                                                            Ther Ex             Y's             d1 ext             Foam roller series (bear hugs, snow angle, punches, backstroke)             Doorway pect stretch  4x15"           scap retraction with tb ER             Low rows                                       Ther Activity             ube retro  6'                        Gait Training                                       Modalities

## 2022-05-02 ENCOUNTER — OFFICE VISIT (OUTPATIENT)
Dept: PHYSICAL THERAPY | Facility: CLINIC | Age: 67
End: 2022-05-02
Payer: MEDICARE

## 2022-05-02 DIAGNOSIS — G89.29 CHRONIC THORACIC SPINE PAIN: Primary | ICD-10-CM

## 2022-05-02 DIAGNOSIS — M54.6 CHRONIC THORACIC SPINE PAIN: Primary | ICD-10-CM

## 2022-05-02 PROCEDURE — 97112 NEUROMUSCULAR REEDUCATION: CPT

## 2022-05-02 PROCEDURE — 97140 MANUAL THERAPY 1/> REGIONS: CPT

## 2022-05-02 PROCEDURE — 97110 THERAPEUTIC EXERCISES: CPT

## 2022-05-02 NOTE — PROGRESS NOTES
Daily Note     Today's date: 2022  Patient name: Caty Lawson  : 1955  MRN: [id number]  Referring provider: Juan J Lord MD  Dx:   Encounter Diagnosis     ICD-10-CM    1  Chronic thoracic spine pain  M54 6     G89 29        Start Time: 1400  Stop Time: 1445  Total time in clinic (min): 45 minutes       Subjective: Patient reports she is feeling okay today however she did experience tightness in her neck following last Wednesday's PT treatment  Patient reports she is performing the HEP 2 to 3 times a day  Objective: See treatment diary below  Assessment: Progression of therapeutic exercise program is tolerated well  Patient is encouraged to modify sets/repetitions for HEP pending her degree of muscle soreness following today's PT treatment  Plan: Continue treatment as per PT plan of care         Precautions: anxiety, chronic cervical spine pain      Manuals           laser L thoracic paraspinals  MO JLW          STM prn  10' MO JLW                                    Neuro Re-Ed             Chin tuck  supine  5"x20 supine  with C-roll  5"x10          TaA  5"x10 5"x10                                                                           Ther Ex             Y's             d1 ext   red  15 ea          Foam roller series (bear hugs, snow angle, punches, backstroke)             Doorway pec stretch  4x15" 20"x3          scap retraction with tb ER   red  15          Low rows   red  15          ube retro   with L-roll  6'                       Ther Activity             ube retro  6'                        Gait Training                                       Modalities

## 2022-05-06 ENCOUNTER — OFFICE VISIT (OUTPATIENT)
Dept: PHYSICAL THERAPY | Facility: CLINIC | Age: 67
End: 2022-05-06
Payer: MEDICARE

## 2022-05-06 DIAGNOSIS — M54.6 CHRONIC THORACIC SPINE PAIN: Primary | ICD-10-CM

## 2022-05-06 DIAGNOSIS — G89.29 CHRONIC THORACIC SPINE PAIN: Primary | ICD-10-CM

## 2022-05-06 PROCEDURE — 97140 MANUAL THERAPY 1/> REGIONS: CPT | Performed by: PHYSICAL THERAPIST

## 2022-05-06 PROCEDURE — 97110 THERAPEUTIC EXERCISES: CPT | Performed by: PHYSICAL THERAPIST

## 2022-05-06 NOTE — PROGRESS NOTES
Daily Note     Today's date: 2022  Patient name: Chelo Aguirre  : 1955  MRN: [id number]  Referring provider: Peyman Flower MD  Dx:   Encounter Diagnosis     ICD-10-CM    1  Chronic thoracic spine pain  M54 6     G89 29                      Subjective: Pt reports she feels she is improving especially since she shuffled the order when performing her HEP  Objective: See treatment diary below  Assessment: continued trigger points noted in L thoracic parapsinals but these are less tender then previoiusly  Initiated thoracic mobilizations and pt has good tolerance to this technique  Progressed therex as listed and instructed pt with add tb ER to her hep  Red tb was provided for home use  Plan: Continue treatment as per PT plan of care         Precautions: anxiety, chronic cervical spine pain      Manuals          laser L thoracic paraspinals  MO JLW kl         STM prn  10' MO JLW kl         Thoracic pa's    Grade 2                      Neuro Re-Ed             Chin tuck  supine  5"x20 supine  with C-roll  5"x10          TaA  5"x10 5"x10                                                                           Ther Ex             Y's    nv         d1 ext   red  15 ea Red x15         Foam roller series (bear hugs, snow angle, punches, backstroke)             Doorway pec stretch  4x15" 20"x3 15"x4         scap retraction with tb ER   red  15 Red x20         Low rows   red  15 TaA lpd green 5"x20         ube retro   with L-roll  6' 6'                      Ther Activity             ube retro  6'                        Gait Training                                       Modalities

## 2022-05-10 ENCOUNTER — OFFICE VISIT (OUTPATIENT)
Dept: PHYSICAL THERAPY | Facility: CLINIC | Age: 67
End: 2022-05-10
Payer: MEDICARE

## 2022-05-10 DIAGNOSIS — M54.6 CHRONIC THORACIC SPINE PAIN: Primary | ICD-10-CM

## 2022-05-10 DIAGNOSIS — G89.29 CHRONIC THORACIC SPINE PAIN: Primary | ICD-10-CM

## 2022-05-10 PROCEDURE — 97110 THERAPEUTIC EXERCISES: CPT | Performed by: PHYSICAL THERAPIST

## 2022-05-10 PROCEDURE — 97530 THERAPEUTIC ACTIVITIES: CPT | Performed by: PHYSICAL THERAPIST

## 2022-05-10 PROCEDURE — 97140 MANUAL THERAPY 1/> REGIONS: CPT | Performed by: PHYSICAL THERAPIST

## 2022-05-10 NOTE — PROGRESS NOTES
Daily Note     Today's date: 5/10/2022  Patient name: Emma King  : 1955  MRN: [id number]  Referring provider: Beto Staley MD  Dx:   Encounter Diagnosis     ICD-10-CM    1  Chronic thoracic spine pain  M54 6     G89 29                      Subjective: Pt reports that she feels she improving but feel sore t/o her UT's and CTJ that believe is caused by holding to much tension when perform scap retraction with tb ER  Objective: See treatment diary below  Assessment: Modified therex secondary mm soreness and instructed pt to hold performing tb therex at home for 24-48 hour or until UT soreness subsides  Less tenderness noted t/o L UT then previously  Pt is comfortable t/o therex and instructed pt to add scap protraction stretch to her hep  Plan: Continue treatment as per PT plan of care         Precautions: anxiety, chronic cervical spine pain      Manuals 4/25 4/27 5/2 5/6 5/10        laser L thoracic paraspinals  MO JLW kl kl        STM prn  10' MO JLW kl kl        Thoracic pa's    Grade 2 Grade 4                     Neuro Re-Ed             Chin tuck  supine  5"x20 supine  with C-roll  5"x10          TaA  5"x10 5"x10                                                                           Ther Ex             Y's    nv 2x10        d1 ext   red  15 ea Red x15 nv        Foam roller series (bear hugs, snow angle, punches, backstroke)             Doorway pec stretch  4x15" 20"x3 15"x4 30"x3        scap retraction with tb ER   red  15 Red x20 nv        Low rows   red  15 TaA lpd green 5"x20 nv        ube retro   with L-roll  6' 6'         scap protraction stretch     10"x10        Ther Activity             ube retro  6'   Retro 8'                     Gait Training                                       Modalities

## 2022-05-13 ENCOUNTER — OFFICE VISIT (OUTPATIENT)
Dept: PHYSICAL THERAPY | Facility: CLINIC | Age: 67
End: 2022-05-13
Payer: MEDICARE

## 2022-05-13 DIAGNOSIS — G89.29 CHRONIC THORACIC SPINE PAIN: Primary | ICD-10-CM

## 2022-05-13 DIAGNOSIS — M54.6 CHRONIC THORACIC SPINE PAIN: Primary | ICD-10-CM

## 2022-05-13 PROCEDURE — 97110 THERAPEUTIC EXERCISES: CPT

## 2022-05-13 PROCEDURE — 97140 MANUAL THERAPY 1/> REGIONS: CPT

## 2022-05-13 PROCEDURE — 97530 THERAPEUTIC ACTIVITIES: CPT

## 2022-05-13 NOTE — PROGRESS NOTES
Daily Note     Today's date: 2022  Patient name: Ebb Felty  : 1955  MRN: [id number]  Referring provider: Lucinda Ramirez MD  Dx:   Encounter Diagnosis     ICD-10-CM    1  Chronic thoracic spine pain  M54 6     G89 29        Start Time: 905  Stop Time: 950  Total time in clinic (min): 45 minutes       Subjective: Patient reports performing B/L shoulder ER at home was bothering her neck and causing a headache therefore she has been avoiding this exercise  Objective: See treatment diary below  Assessment: Therapeutic exercise program is tolerated well  Tenderness is not significant during STM  Plan: Continue treatment as per PT plan of care         Precautions: anxiety, chronic cervical spine pain      Manuals 4/25 4/27 5/2 5/6 5/10 5/13       laser L thoracic paraspinals  MO JLW kl kl JLW       STM prn  10' MO JLW kl kl JLW       Thoracic pa's    Grade 2 Grade 4 KL                    Neuro Re-Ed             Chin tuck  supine  5"x20 supine  with C-roll  5"x10          TaA  5"x10 5"x10                                                                           Ther Ex             Y's    nv 2x10        d1 ext   red  15 ea Red x15 nv red  20       Foam roller series (bear hugs, snow angle, punches, backstroke)             Doorway pec stretch  4x15" 20"x3 15"x4 30"x3 30"x3       scap retraction with tb ER   red  15 Red x20 nv red  10       Low rows   red  15 TaA lpd green 5"x20 nv red  20       ube retro   with L-roll  6' 6'         scap protraction stretch     10"x10                     Ther Activity             ube retro  6'   Retro 8' retro  8'                    Gait Training                                       Modalities

## 2022-05-20 ENCOUNTER — OFFICE VISIT (OUTPATIENT)
Dept: PHYSICAL THERAPY | Facility: CLINIC | Age: 67
End: 2022-05-20
Payer: MEDICARE

## 2022-05-20 DIAGNOSIS — M54.6 CHRONIC THORACIC SPINE PAIN: Primary | ICD-10-CM

## 2022-05-20 DIAGNOSIS — G89.29 CHRONIC THORACIC SPINE PAIN: Primary | ICD-10-CM

## 2022-05-20 PROCEDURE — 97140 MANUAL THERAPY 1/> REGIONS: CPT

## 2022-05-20 PROCEDURE — 97112 NEUROMUSCULAR REEDUCATION: CPT

## 2022-05-20 PROCEDURE — 97110 THERAPEUTIC EXERCISES: CPT

## 2022-05-20 NOTE — PROGRESS NOTES
Daily Note     Today's date: 2022  Patient name: Paulo Cabrales  : 1955  MRN: [id number]  Referring provider: Lenora Mireles MD  Dx:   Encounter Diagnosis     ICD-10-CM    1  Chronic thoracic spine pain  M54 6     G89 29                      Subjective: Patient returns after missing a week due to T-S muscle spasms and HA following last session  Unable to tolerate HEP  Objective: See treatment diary below  Assessment: Modified POC as outlined  Several cues for improved mid/low trap to prevent UT compensation  Tolerated well  Issued YTB for HEP  Plan: Continue treatment as per PT plan of care         Precautions: anxiety, chronic cervical spine pain      Manuals 4/25 4/27 5/2 5/6 5/10 5/13 5/20      laser L thoracic paraspinals  MO JLW kl kl JLW CM      STM prn  10' MO JLW kl kl JLW CM      Thoracic pa's    Grade 2 Grade 4 KL NP                   Neuro Re-Ed             Chin tuck  supine  5"x20 supine  with C-roll  5"x10          TaA  5"x10 5"x10                                                                           Ther Ex             Y's    nv 2x10        d1 ext   red  15 ea Red x15 nv red  20 NP      Foam roller series (bear hugs, snow angle, punches, backstroke)             Doorway pec stretch  4x15" 20"x3 15"x4 30"x3 30"x3 Low hands  30"x3      scap retraction with tb ER   red  15 Red x20 nv red  10 Yellow  x10      Low rows   red  15 TaA lpd green 5"x20 nv red  20 Yellow  x10      ube retro   with L-roll  6' 6'         scap protraction stretch     10"x10                     Ther Activity             ube retro  6'   Retro 8' retro  8' Retro  5 mins                   Gait Training                                       Modalities

## 2022-05-24 ENCOUNTER — OFFICE VISIT (OUTPATIENT)
Dept: PHYSICAL THERAPY | Facility: CLINIC | Age: 67
End: 2022-05-24
Payer: MEDICARE

## 2022-05-24 DIAGNOSIS — M54.6 CHRONIC THORACIC SPINE PAIN: Primary | ICD-10-CM

## 2022-05-24 DIAGNOSIS — G89.29 CHRONIC THORACIC SPINE PAIN: Primary | ICD-10-CM

## 2022-05-24 PROCEDURE — 97530 THERAPEUTIC ACTIVITIES: CPT

## 2022-05-24 PROCEDURE — 97140 MANUAL THERAPY 1/> REGIONS: CPT

## 2022-05-24 PROCEDURE — 97110 THERAPEUTIC EXERCISES: CPT

## 2022-05-24 NOTE — PROGRESS NOTES
Daily Note     Today's date: 2022  Patient name: Adam Javed  : 1955  MRN: [id number]  Referring provider: Darrin Singleton MD  Dx:   Encounter Diagnosis     ICD-10-CM    1  Chronic thoracic spine pain  M54 6     G89 29        Start Time: 935  Stop Time: 1020  Total time in clinic (min): 45 minutes      Subjective: Patient reports her neck has been feeling tight  Patient reports spending time on her iPad and potentially spending prolonged periods of time in cervical flexion sustained posture  Objective: See treatment diary below  Assessment: Patient better able to avoid UT substitution when performing row and low row one arm at a time  Encouraged patient to perform UT stretching at home to alleviate muscle tightness  Plan: Continue treatment as per PT plan of care         Precautions: anxiety, chronic cervical spine pain      Manuals 4/25 4/27 5/2 5/6 5/10 5/13 5/20 5/24     laser L thoracic paraspinals  MO JLW kl kl JLW CM JLW     STM prn  10' MO JLW kl kl JLW CM JLW     Thoracic pa's    Grade 2 Grade 4 KL NP KL                  Neuro Re-Ed             Chin tuck  supine  5"x20 supine  with C-roll  5"x10     review     TaA  5"x10 5"x10                                                                           Ther Ex             Y's    nv 2x10        d1 ext   red  15 ea Red x15 nv red  20 NP      Foam roller series (bear hugs, snow angle, punches, backstroke)             Doorway pec stretch  4x15" 20"x3 15"x4 30"x3 30"x3 Low hands  30"x3 low hands  30"x3     scap retraction with tb ER   red  15 Red x20 nv red  10 Yellow  x10 hold     Low rows   red  15 TaA lpd green 5"x20 nv red  20 Yellow  x10 yellow  20     row        yellow  20     ube retro   with L-roll  6' 6'         scap protraction stretch     10"x10                     Ther Activity             ube retro  6'   Retro 8' retro  8' Retro  5 mins retro  8'                  Gait Training Modalities

## 2022-05-27 ENCOUNTER — OFFICE VISIT (OUTPATIENT)
Dept: PHYSICAL THERAPY | Facility: CLINIC | Age: 67
End: 2022-05-27
Payer: MEDICARE

## 2022-05-27 DIAGNOSIS — M54.6 CHRONIC THORACIC SPINE PAIN: Primary | ICD-10-CM

## 2022-05-27 DIAGNOSIS — G89.29 CHRONIC THORACIC SPINE PAIN: Primary | ICD-10-CM

## 2022-05-27 PROCEDURE — 97530 THERAPEUTIC ACTIVITIES: CPT | Performed by: PHYSICAL THERAPIST

## 2022-05-27 PROCEDURE — 97110 THERAPEUTIC EXERCISES: CPT | Performed by: PHYSICAL THERAPIST

## 2022-05-27 PROCEDURE — 97140 MANUAL THERAPY 1/> REGIONS: CPT | Performed by: PHYSICAL THERAPIST

## 2022-05-27 NOTE — PROGRESS NOTES
Daily Note     Today's date: 2022  Patient name: Vicky Patrick  : 1955  MRN: [id number]  Referring provider: Radha Pavon MD  Dx:   Encounter Diagnosis     ICD-10-CM    1  Chronic thoracic spine pain  M54 6     G89 29                     Subjective: Patient reports improved tolerance to her hep with the modification made LV  Reports that she will be on vacation in [place] for the next 2 weeks and will f/u with PT upon her return  Instructed her on the importance of continuing       Objective: See treatment diary below  Assessment: improved form noted with therex today, less frequent VC/MC required to avoid UT substitutions  Good tolerance to manual tx  Continued hypomobility noted t/o thoracic spine  Plan: Continue treatment as per PT plan of care         Precautions: anxiety, chronic cervical spine pain      Manuals 4/25 4/27 5/2 5/6 5/10 5/13 5/20 5/24 5/27    laser L thoracic paraspinals  MO JLW kl kl JLW CM JLW kl    STM prn  10' MO JLW kl kl JLW CM JLW kl    Thoracic pa's    Grade 2 Grade 4 KL NP KL kl                 Neuro Re-Ed             Chin tuck  supine  5"x20 supine  with C-roll  5"x10     review     TaA  5"x10 5"x10                                                                           Ther Ex             Y's    nv 2x10        d1 ext   red  15 ea Red x15 nv red  20 NP      Foam roller series (bear hugs, snow angle, punches, backstroke)             Doorway pec stretch  4x15" 20"x3 15"x4 30"x3 30"x3 Low hands  30"x3 low hands  30"x3 30"x3    scap retraction with tb ER   red  15 Red x20 nv red  10 Yellow  x10 hold     Low rows   red  15 TaA lpd green 5"x20 nv red  20 Yellow  x10 yellow  20 red x20    row        yellow  20 Red 20    ube retro   with L-roll  6' 6'         scap protraction stretch     10"x10                     Ther Activity             ube retro  6'   Retro 8' retro  8' Retro  5 mins retro  8' Retro 8'                 Gait Training Modalities

## 2022-05-31 ENCOUNTER — APPOINTMENT (OUTPATIENT)
Dept: PHYSICAL THERAPY | Facility: CLINIC | Age: 67
End: 2022-05-31
Payer: MEDICARE

## 2022-06-03 ENCOUNTER — APPOINTMENT (OUTPATIENT)
Dept: PHYSICAL THERAPY | Facility: CLINIC | Age: 67
End: 2022-06-03
Payer: MEDICARE

## 2022-06-10 ENCOUNTER — OFFICE VISIT (OUTPATIENT)
Dept: PHYSICAL THERAPY | Facility: CLINIC | Age: 67
End: 2022-06-10
Payer: MEDICARE

## 2022-06-10 DIAGNOSIS — G89.29 CHRONIC THORACIC SPINE PAIN: Primary | ICD-10-CM

## 2022-06-10 DIAGNOSIS — M54.6 CHRONIC THORACIC SPINE PAIN: Primary | ICD-10-CM

## 2022-06-10 PROCEDURE — 97530 THERAPEUTIC ACTIVITIES: CPT | Performed by: PHYSICAL THERAPIST

## 2022-06-10 PROCEDURE — 97110 THERAPEUTIC EXERCISES: CPT | Performed by: PHYSICAL THERAPIST

## 2022-06-10 PROCEDURE — 97140 MANUAL THERAPY 1/> REGIONS: CPT | Performed by: PHYSICAL THERAPIST

## 2022-06-10 NOTE — PROGRESS NOTES
Daily Note     Today's date: 6/10/2022  Patient name: Eden Montes De Oca  : 1955  MRN: [id number]  Referring provider: Omari Sifuentes MD  Dx:   Encounter Diagnosis     ICD-10-CM    1  Chronic thoracic spine pain  M54 6     G89 29                     Subjective: Patient reports no sig increases in pain while away on vacation  Reports she was complaint with stretching and partially compliant with strength training  Objective: See treatment diary below  Assessment: pt remains tender along thoracic paraspinals during manual tx but this is less intense then previously  Plan: Continue treatment as per PT plan of care         Precautions: anxiety, chronic cervical spine pain      Manuals 4/25 4/27 5/2 5/6 5/10 5/13 5/20 5/24 5/27 6/10   laser L thoracic paraspinals  MO JLW kl kl JLW CM JLW kl kl   STM prn  10' MO JLW kl kl JLW CM JLW kl kl   Thoracic pa's    Grade 2 Grade 4 KL NP KL kl kl                Neuro Re-Ed             Chin tuck  supine  5"x20 supine  with C-roll  5"x10     review     TaA  5"x10 5"x10                                                                           Ther Ex             Y's    nv 2x10        d1 ext   red  15 ea Red x15 nv red  20 NP      Foam roller series (bear hugs, snow angle, punches, backstroke)             Doorway pec stretch  4x15" 20"x3 15"x4 30"x3 30"x3 Low hands  30"x3 low hands  30"x3 30"x3 30"x3   scap retraction with tb ER   red  15 Red x20 nv red  10 Yellow  x10 hold     Low rows   red  15 TaA lpd green 5"x20 nv red  20 Yellow  x10 yellow  20 red x20 Green x20   row        yellow  20 Red 20 Green x20   ube retro   with L-roll  6' 6'         scap protraction stretch     10"x10        scap retraction with tb ER          x20   Ther Activity             ube retro  6'   Retro 8' retro  8' Retro  5 mins retro  8' Retro 8' 8'                Gait Training                                       Modalities

## 2022-06-14 ENCOUNTER — APPOINTMENT (OUTPATIENT)
Dept: PHYSICAL THERAPY | Facility: CLINIC | Age: 67
End: 2022-06-14
Payer: MEDICARE

## 2022-06-15 ENCOUNTER — OFFICE VISIT (OUTPATIENT)
Dept: PHYSICAL THERAPY | Facility: CLINIC | Age: 67
End: 2022-06-15
Payer: MEDICARE

## 2022-06-15 DIAGNOSIS — G89.29 CHRONIC THORACIC SPINE PAIN: Primary | ICD-10-CM

## 2022-06-15 DIAGNOSIS — M54.6 CHRONIC THORACIC SPINE PAIN: Primary | ICD-10-CM

## 2022-06-15 PROCEDURE — 97530 THERAPEUTIC ACTIVITIES: CPT

## 2022-06-15 PROCEDURE — 97140 MANUAL THERAPY 1/> REGIONS: CPT

## 2022-06-15 PROCEDURE — 97110 THERAPEUTIC EXERCISES: CPT

## 2022-06-15 NOTE — PROGRESS NOTES
Daily Note     Today's date: 6/15/2022  Patient name: Surya Brunson  : 1955  MRN: [id number]  Referring provider: Holley Washington MD  Dx:   Encounter Diagnosis     ICD-10-CM    1  Chronic thoracic spine pain  M54 6     G89 29        Start Time: 934  Stop Time: 1015  Total time in clinic (min): 41 minutes       Subjective: Patient reports her back has been feeling better  Objective: See treatment diary below  Assessment: Therapeutic exercise program is tolerated well without complaints  Pain/tenderness not significant during STM  Plan: Continue treatment as per PT plan of care         Precautions: anxiety, chronic cervical spine pain      Manuals  6/15  5/2 5/6 5/10 5/13 5/20 5/24 5/27 6/10   laser L thoracic paraspinals JLW  JLW kl kl JLW CM JLW kl kl   STM prn JLW  JLW kl kl JLW CM JLW kl kl   Thoracic pa's    Grade 2 Grade 4 KL NP KL kl kl                Neuro Re-Ed             Chin tuck   supine  with C-roll  5"x10     review     TaA   5"x10                                                                           Ther Ex             Y's    nv 2x10        d1 ext   red  15 ea Red x15 nv red  20 NP      Foam roller series (bear hugs, snow angle, punches, backstroke)             Doorway pec stretch 30"x3  20"x3 15"x4 30"x3 30"x3 Low hands  30"x3 low hands  30"x3 30"x3 30"x3   scap retraction with tb ER red  20  red  15 Red x20 nv red  10 Yellow  x10 hold     Low rows green  20  red  15 TaA lpd green 5"x20 nv red  20 Yellow  x10 yellow  20 red x20 Green x20   row green  20       yellow  20 Red 20 Green x20   ube retro   with L-roll  6' 6'         scap protraction stretch     10"x10        scap retraction with tb ER          x20                Ther Activity             ube retro  8'    Retro 8' retro  8' Retro  5 mins retro  8' Retro 8' 8'                Gait Training                                       Modalities

## 2022-06-17 ENCOUNTER — OFFICE VISIT (OUTPATIENT)
Dept: PHYSICAL THERAPY | Facility: CLINIC | Age: 67
End: 2022-06-17
Payer: MEDICARE

## 2022-06-17 DIAGNOSIS — M54.6 CHRONIC THORACIC SPINE PAIN: Primary | ICD-10-CM

## 2022-06-17 DIAGNOSIS — G89.29 CHRONIC THORACIC SPINE PAIN: Primary | ICD-10-CM

## 2022-06-17 PROCEDURE — 97530 THERAPEUTIC ACTIVITIES: CPT

## 2022-06-17 PROCEDURE — 97110 THERAPEUTIC EXERCISES: CPT

## 2022-06-17 PROCEDURE — 97140 MANUAL THERAPY 1/> REGIONS: CPT

## 2022-06-17 NOTE — PROGRESS NOTES
Daily Note     Today's date: 2022  Patient name: Carolyn Rubi  : 1955  MRN: [id number]  Referring provider: Robert Pagan MD  Dx:   Encounter Diagnosis     ICD-10-CM    1  Chronic thoracic spine pain  M54 6     G89 29                   Subjective: Pt reports some L thoracic discomfort cont  Objective: See treatment diary below      Assessment: Performed exercise program w/o issue  Responded well to manual therapies, relief after same  Will monitor  Plan:  Cont per POC    Precautions: anxiety, chronic cervical spine pain      Manuals  6/15 6/17  5/6 5/10 5/13 5/20 5/24 5/27 6/10   laser L thoracic paraspinals JLW MO  kl kl JLW CM JLW kl kl   STM prn JLW MO  kl kl JLW CM JLW kl kl   Thoracic pa's    Grade 2 Grade 4 KL NP KL kl kl                Neuro Re-Ed             Chin tuck        review     TaA                                                                              Ther Ex             Y's    nv 2x10        d1 ext    Red x15 nv red  20 NP      Foam roller series (bear hugs, snow angle, punches, backstroke)             Doorway pec stretch 30"x3 30"x3  15"x4 30"x3 30"x3 Low hands  30"x3 low hands  30"x3 30"x3 30"x3   scap retraction with tb ER red  20 Red  20  Red x20 nv red  10 Yellow  x10 hold     Low rows green  20 Green  20  TaA lpd green 5"x20 nv red  20 Yellow  x10 yellow  20 red x20 Green x20   row green  20 Green  20      yellow  20 Red 20 Green x20   ube retro    6'         scap protraction stretch     10"x10        scap retraction with tb ER          x20                Ther Activity             ube retro  8' 8'   Retro 8' retro  8' Retro  5 mins retro  8' Retro 8' 8'                Gait Training                                       Modalities

## 2022-06-21 ENCOUNTER — OFFICE VISIT (OUTPATIENT)
Dept: PHYSICAL THERAPY | Facility: CLINIC | Age: 67
End: 2022-06-21
Payer: MEDICARE

## 2022-06-21 DIAGNOSIS — G89.29 CHRONIC THORACIC SPINE PAIN: Primary | ICD-10-CM

## 2022-06-21 DIAGNOSIS — M54.6 CHRONIC THORACIC SPINE PAIN: Primary | ICD-10-CM

## 2022-06-21 PROCEDURE — 97140 MANUAL THERAPY 1/> REGIONS: CPT

## 2022-06-21 PROCEDURE — 97110 THERAPEUTIC EXERCISES: CPT

## 2022-06-21 NOTE — PROGRESS NOTES
Daily Note/DC Summary     Today's date: 2022  Patient name: Billy Samson  : 1955  MRN: [id number]  Referring provider: Tami Tenorio MD  Dx:   Encounter Diagnosis     ICD-10-CM    1  Chronic thoracic spine pain  M54 6     G89 29        Start Time: 935  Stop Time: 1018  Total time in clinic (min): 43 minutes      Subjective: Patient reports thoracic spine pain is improved since attending PT  Patient reports she is not limited when performing daily activities  Objective: See treatment diary below  Assessment: Patient demonstrates good exercise technique throughout performance of TE program   Encouraged patient to do self trigger point release utilizing a tennis ball against the wall  Patient is ready to transition to independent HEP  Plan: Discharge from outpatient PT         Precautions: anxiety, chronic cervical spine pain      Manuals  6/15 6/17 6/21  5/10 5/13 5/20 5/24 5/27 6/10   laser L thoracic paraspinals JLW MO JLW  kl JLW CM JLW kl kl   STM prn JLW MO JLW  kl JLW CM JLW kl kl   Thoracic pa's     Grade 4 KL NP KL kl kl                Neuro Re-Ed             Chin tu        review     TaA                                                                              Ther Ex             Y's     2x10        d1 ext     nv red  20 NP      Foam roller series (bear hugs, snow angle, punches, backstroke)             Doorway pec stretch 30"x3 30"x3 30"x3  30"x3 30"x3 Low hands  30"x3 low hands  30"x3 30"x3 30"x3   scap retraction with tb ER red  20 Red  20 red  20  nv red  10 Yellow  x10 hold     Low rows green  20 Green  20 green  20  nv red  20 Yellow  x10 yellow  20 red x20 Green x20   row green  20 Green  20 green  20     yellow  20 Red 20 Green x20   ube retro   8'          scap protraction stretch     10"x10        scap retraction with tb ER          x20                Ther Activity             ube retro  8' 8'   Retro 8' retro  8' Retro  5 mins retro  8' Retro 8' 8'                Gait Training                                       Modalities

## 2022-10-27 ENCOUNTER — HOSPITAL ENCOUNTER (OUTPATIENT)
Dept: ULTRASOUND IMAGING | Facility: CLINIC | Age: 67
Discharge: HOME | End: 2022-10-27
Payer: MEDICARE

## 2022-10-27 VITALS — BODY MASS INDEX: 25.95 KG/M2 | WEIGHT: 141 LBS | HEIGHT: 62 IN

## 2022-10-27 DIAGNOSIS — R92.2 DENSE BREAST TISSUE ON MAMMOGRAM: ICD-10-CM

## 2022-10-27 PROCEDURE — 76641 ULTRASOUND BREAST COMPLETE: CPT

## 2023-01-05 ENCOUNTER — ULTRASOUND (OUTPATIENT)
Dept: GYNECOLOGY | Facility: CLINIC | Age: 68
End: 2023-01-05

## 2023-01-05 ENCOUNTER — ANNUAL EXAM (OUTPATIENT)
Dept: GYNECOLOGY | Facility: CLINIC | Age: 68
End: 2023-01-05

## 2023-01-05 DIAGNOSIS — D21.9 FIBROIDS: Primary | ICD-10-CM

## 2023-01-05 DIAGNOSIS — Z12.4 SCREENING FOR CERVICAL CANCER: Primary | ICD-10-CM

## 2023-01-05 DIAGNOSIS — N95.2 ATROPHY OF VAGINA: ICD-10-CM

## 2023-01-05 DIAGNOSIS — D25.9 UTERINE LEIOMYOMA, UNSPECIFIED LOCATION: ICD-10-CM

## 2023-01-05 DIAGNOSIS — R92.2 DENSE BREAST: ICD-10-CM

## 2023-01-05 DIAGNOSIS — R10.2 FEMALE PELVIC PAIN: ICD-10-CM

## 2023-01-05 DIAGNOSIS — Z12.31 ENCOUNTER FOR SCREENING MAMMOGRAM FOR BREAST CANCER: ICD-10-CM

## 2023-01-05 NOTE — PROGRESS NOTES
AMB US Pelvic Non OB    Date/Time: 1/5/2023 7:00 AM  Performed by: Latesha Huertas  Authorized by: Mercedes Green MD   Universal Protocol:  Patient identity confirmed: verbally with patient      Procedure details:     Technique:  Transvaginal US, Non-OB    Position: lithotomy exam    Uterine findings:     Length (cm): 5 63    Height (cm):  2 83    Width (cm):  3 81    Endometrial stripe: identified      Endometrium thickness (mm):  2  Left ovary findings:     Left ovary:  Visualized    Length (cm): 1 64    Height (cm): 0 93    Width (cm): 0 97  Right ovary findings:     Right ovary:  Visualized    Length (cm): 1 73    Height (cm): 1 06    Width (cm): 1 07  Other findings:     Free pelvic fluid: not identified      Free peritoneal fluid: not identified    Post-Procedure Details:     Impression:  Retroverted uterus demonstrates stable fibroids  Largest are anterior fundal 1 0cm, lower uterine segment 0 9cm, and anterior intramural 1 0cm  Bilateral ovaries appear within normal limits  No free fluid  Tolerance: Tolerated well, no immediate complications    Complications: no complications    Additional Procedure Comments:      [employer] F8 E8C-RS transvaginal transducer Serial # [id number] was used to perform the examination today and subsequently followed with high level disinfection utilizing Trophon EPR procedure  Ultrasound performed at:     [address] [address]  Phone:  [phone]  Fax:  [phone]

## 2023-01-05 NOTE — PROGRESS NOTES
Assessment/Plan   Diagnoses and all orders for this visit:    Female pelvic pain    Encounter for screening mammogram for breast cancer  -     Mammo screening bilateral w 3d & cad; Future    Atrophy of vagina    Uterine leiomyoma, unspecified location    Dense breast  -     US breast screening bilateral complete (ABUS); Future    1  Intermittent pelvic discomfort- denies any severe pain at this time  Ultrasound today was unremarkable, with stable fibroids noted as documented below  To call return with any significant symptoms  2   Uterine fibroids- continues to be stable, 1 cm, 0 9 cm, and 1 cm today, unchanged from prior ultrasound  Call or return with any issues  3  vaginal atrophy-mild changes noted on exam   She does use lubrication with overall good results  Vaginal lubrication/moisturizer sheet given, to use accordingly  4  prior history of postmenopausal bleeding- denies any complaints  Endometrium 2 mm today  To call or return with any symptoms  5  dense breast changes- has mammogram scheduled tomorrow, request given  She also does get ABUS, had last ABUS 10/27/2022 which was unremarkable  She does wish to continue with ABUS staggered with the mammograms  Request for ABUS was given postdated 7/1/2023  6  family history of breast cancer-mother with bilateral breast cancer at ages 76 and [de-identified]  Genetic testing was not done  Patient declines genetic testing at this time  To continue with mammogram/ABUS as noted above  Follow-up 1 year for yearly exam or as needed  Subjective   Patient ID: Adrienne Mishra is a 78 y o  female  There were no vitals filed for this visit  Patient was seen today for ultrasound and yearly visit  Please see assessment plan and ultrasound note for details        The following portions of the patient's history were reviewed and updated as appropriate: allergies, current medications, past family history, past medical history, past social history, past surgical history and problem list   Past Medical History:   Diagnosis Date   • Endometriosis    • Female infertility    • Fibrocystic breast    • Fibroid    • History of chemotherapy 2016    Hairy cell leukemia   • Recurrent pregnancy loss, antepartum condition or complication    • Skin cancer      Past Surgical History:   Procedure Laterality Date   • BREAST BIOPSY Left 10/09/2020    2:00 7cmfn   • LAPAROSCOPY     • US GUIDED BREAST BIOPSY LEFT COMPLETE Left 10/9/2020     OB History    Para Term  AB Living   1   0   1     SAB IAB Ectopic Multiple Live Births   1              # Outcome Date GA Lbr Marc/2nd Weight Sex Delivery Anes PTL Lv   1 SAB                Current Outpatient Medications:   •  Calcium Citrate-Vitamin D 315-250 MG-UNIT TABS, , Disp: , Rfl:   •  cyclobenzaprine (FLEXERIL) 10 mg tablet, cyclobenzaprine 10 mg tablet  TAKE 1 TABLET BY MOUTH THREE TIMES A DAY AS NEEDED, Disp: , Rfl:   •  ergocalciferol (VITAMIN D2) 50,000 units, , Disp: , Rfl:   •  escitalopram (LEXAPRO) 10 mg tablet, Take by mouth, Disp: , Rfl:   •  fluticasone (FLONASE) 50 mcg/act nasal spray, 2 sprays into each nostril, Disp: , Rfl:   •  ketoconazole (NIZORAL) 2 % cream, Apply topically 2 (two) times a day as needed, Disp: , Rfl:   •  losartan (COZAAR) 100 MG tablet, Take 100 mg by mouth daily, Disp: , Rfl:     Current Facility-Administered Medications:   •  dexamethasone (DECADRON) injection 4 mg, 4 mg, Iontophoresis, Daily, Toby Bougie Lachman, MD  Allergies   Allergen Reactions   • Iodinated Contrast Media    • Methylisothiazolinone Itching and Rash     Social History     Socioeconomic History   • Marital status: /Civil Union     Spouse name: Not on file   • Number of children: Not on file   • Years of education: Not on file   • Highest education level: Not on file   Occupational History   • Not on file   Tobacco Use   • Smoking status: Never   • Smokeless tobacco: Never   Vaping Use   • Vaping Use: Never used Substance and Sexual Activity   • Alcohol use: Yes   • Drug use: No   • Sexual activity: Yes     Partners: Male   Other Topics Concern   • Not on file   Social History Narrative   • Not on file     Social Determinants of Health     Financial Resource Strain: Not on file   Food Insecurity: Not on file   Transportation Needs: Not on file   Physical Activity: Not on file   Stress: Not on file   Social Connections: Not on file   Intimate Partner Violence: Not on file   Housing Stability: Not on file     Family History   Problem Relation Age of Onset   • Breast cancer Mother 76        reoccurrence on other breast [de-identified]years old   • Coronary artery disease Father    • Hypertension Father    • No Known Problems Sister    • No Known Problems Maternal Grandmother    • Stomach cancer Maternal Grandfather    • No Known Problems Paternal Grandmother    • No Known Problems Paternal Grandfather    • No Known Problems Paternal Aunt    • No Known Problems Paternal Aunt        Review of Systems   Constitutional: Negative for chills, diaphoresis, fatigue and fever  Respiratory: Negative for apnea, cough, chest tightness, shortness of breath and wheezing  Cardiovascular: Negative for chest pain, palpitations and leg swelling  Gastrointestinal: Negative for abdominal distention, abdominal pain, anal bleeding, constipation, diarrhea, nausea, rectal pain and vomiting  Genitourinary: Negative for difficulty urinating, dyspareunia, dysuria, frequency, hematuria, menstrual problem, pelvic pain, urgency, vaginal bleeding, vaginal discharge and vaginal pain  Musculoskeletal: Negative for arthralgias, back pain and myalgias  Skin: Negative for color change and rash  Neurological: Negative for dizziness, syncope, light-headedness, numbness and headaches  Hematological: Negative for adenopathy  Does not bruise/bleed easily  Psychiatric/Behavioral: Negative for dysphoric mood and sleep disturbance   The patient is not nervous/anxious  Objective   Physical Exam  OBGyn Exam     Objective      There were no vitals taken for this visit  General:   alert and oriented, in no acute distress   Neck: normal to inspection and palpation   Breast: normal appearance, no masses or tenderness   Heart:    Lungs:    Abdomen: soft, non-tender, without masses or organomegaly   Vulva: normal   Vagina:  Mildly atrophic, without erythema or lesions or discharge  Cervix:  Mildly atrophic, without lesions or discharge or cervicitis    No CMT   Uterus: mid-position, non-tender, size consistent with Top normal to 6 weeks   Adnexa: no mass, fullness, tenderness   Rectum: negative    Psych:  Normal mood and affect   Skin:  Without obvious lesions   Eyes: symmetric, with normal movements and reactivity   Musculoskeletal:  Normal muscle tone and movements appreciated

## 2023-01-06 ENCOUNTER — HOSPITAL ENCOUNTER (OUTPATIENT)
Dept: MAMMOGRAPHY | Facility: MEDICAL CENTER | Age: 68
Discharge: HOME | End: 2023-01-06

## 2023-01-06 VITALS — BODY MASS INDEX: 25.96 KG/M2 | HEIGHT: 62 IN | WEIGHT: 141.09 LBS

## 2023-01-06 DIAGNOSIS — Z12.31 BREAST CANCER SCREENING BY MAMMOGRAM: ICD-10-CM

## 2023-01-11 LAB
LAB AP GYN PRIMARY INTERPRETATION: NORMAL
Lab: NORMAL

## 2023-07-25 ENCOUNTER — HOSPITAL ENCOUNTER (OUTPATIENT)
Dept: ULTRASOUND IMAGING | Facility: CLINIC | Age: 68
Discharge: HOME | End: 2023-07-25
Payer: MEDICARE

## 2023-07-25 VITALS — WEIGHT: 141 LBS | BODY MASS INDEX: 25.95 KG/M2 | HEIGHT: 62 IN

## 2023-07-25 DIAGNOSIS — R92.2 DENSE BREAST: ICD-10-CM

## 2023-07-25 PROCEDURE — 76641 ULTRASOUND BREAST COMPLETE: CPT

## 2024-01-08 ENCOUNTER — HOSPITAL ENCOUNTER (OUTPATIENT)
Dept: MAMMOGRAPHY | Facility: MEDICAL CENTER | Age: 69
Discharge: HOME | End: 2024-01-08
Payer: MEDICARE

## 2024-01-08 VITALS — HEIGHT: 62 IN | WEIGHT: 141 LBS | BODY MASS INDEX: 25.95 KG/M2

## 2024-01-08 DIAGNOSIS — Z12.31 ENCOUNTER FOR SCREENING MAMMOGRAM FOR BREAST CANCER: ICD-10-CM

## 2024-01-08 PROCEDURE — 77067 SCR MAMMO BI INCL CAD: CPT

## 2024-01-08 PROCEDURE — 77063 BREAST TOMOSYNTHESIS BI: CPT

## 2024-01-10 DIAGNOSIS — R92.30 DENSE BREASTS: Primary | ICD-10-CM

## 2024-01-10 DIAGNOSIS — R92.2 DENSE BREASTS: Primary | ICD-10-CM

## 2024-01-10 NOTE — RESULT ENCOUNTER NOTE
Mammogram within normal limits with increased breast density noted.  Could consider automated breast ultrasound soon and/or 3D mammogram in 1 year's time.

## 2024-02-01 ENCOUNTER — ULTRASOUND (OUTPATIENT)
Dept: GYNECOLOGY | Facility: CLINIC | Age: 69
End: 2024-02-01
Payer: MEDICARE

## 2024-02-01 ENCOUNTER — ANNUAL EXAM (OUTPATIENT)
Dept: GYNECOLOGY | Facility: CLINIC | Age: 69
End: 2024-02-01
Payer: MEDICARE

## 2024-02-01 VITALS — SYSTOLIC BLOOD PRESSURE: 118 MMHG | DIASTOLIC BLOOD PRESSURE: 78 MMHG | BODY MASS INDEX: 25.79 KG/M2 | HEIGHT: 62 IN

## 2024-02-01 DIAGNOSIS — Z01.419 WOMEN'S ANNUAL ROUTINE GYNECOLOGICAL EXAMINATION: Primary | ICD-10-CM

## 2024-02-01 DIAGNOSIS — D21.9 FIBROIDS: Primary | ICD-10-CM

## 2024-02-01 DIAGNOSIS — R92.2 DENSE BREAST: ICD-10-CM

## 2024-02-01 DIAGNOSIS — Z12.31 ENCOUNTER FOR SCREENING MAMMOGRAM FOR MALIGNANT NEOPLASM OF BREAST: ICD-10-CM

## 2024-02-01 DIAGNOSIS — R10.2 FEMALE PELVIC PAIN: ICD-10-CM

## 2024-02-01 DIAGNOSIS — N95.2 ATROPHY OF VAGINA: ICD-10-CM

## 2024-02-01 DIAGNOSIS — D25.9 UTERINE LEIOMYOMA, UNSPECIFIED LOCATION: ICD-10-CM

## 2024-02-01 DIAGNOSIS — R92.30 DENSE BREAST: ICD-10-CM

## 2024-02-01 PROCEDURE — G0101 CA SCREEN;PELVIC/BREAST EXAM: HCPCS | Performed by: OBSTETRICS & GYNECOLOGY

## 2024-02-01 PROCEDURE — 76830 TRANSVAGINAL US NON-OB: CPT | Performed by: OBSTETRICS & GYNECOLOGY

## 2024-02-01 RX ORDER — AZELASTINE HCL 205.5 UG/1
SPRAY NASAL
COMMUNITY

## 2024-02-01 NOTE — PROGRESS NOTES
Assessment/Plan   Diagnoses and all orders for this visit:    Women's annual routine gynecological examination    Encounter for screening mammogram for malignant neoplasm of breast  -     Mammo screening bilateral w 3d & cad; Future    Atrophy of vagina    Uterine leiomyoma, unspecified location    Dense breast    Female pelvic pain    Other orders  -     Cholecalciferol 1.25 MG (38335 UT) capsule; Take by mouth  -     Azelastine HCl (Astepro) 0.15 % SOLN; into each nostril    1. yearly exam-Pap smear deferred, self breast awareness reviewed, calcium/vitamin D recommendations discussed, mammogram request given, colonoscopy up-to-date follow-up as per specialist.  Had colonoscopy 8/14/2023 with polyp, now 3-year follow-up  2. uterine myomas-stable on exam and ultrasound today, 1 cm, 0.9 cm, and 1 cm unchanged from previous ultrasound.  She will call return with any issues.  3.  Vaginal atrophy-mild changes noted on exam.  Continues to use lubrication with good results.  Vaginal lubrication/moisturizer sheet given, to use accordingly.  4. previous history of postmenopausal bleeding-denies any complaints, status post D&C 2013, endometrial biopsy 2015.  Endometrium today 1.3 mm.  5. intermittent pelvic discomfort/history of infertility treatment without subsequent pregnancy-patient wishes to continue with close evaluation with exam and ultrasounds.  Ovaries were normal today on ultrasound.  Her discomfort is intermittent but minimal at this time.  To call return with any issues.  6. dense breast changes-noted on most recent mammogram 1/8/2024.  Tyrer-Cuzick risk was 17%.  Patient wishes to continue with ABUS, request given.  7.  Family history of breast cancer-mother with bilateral breast cancer ages 65/80.  Genetic testing was not done.  She will continue with mammogram/ABUS as noted above.  8.  Borderline osteopenia-DEXA scan 2/8/2021 with lumbar spine -1.0, femoral neck minus 0.7.  Did discuss repeat DEXA scan, has  discussed this with her primary doctor and they will plan to get this done in the next 1 to 2 years time.  Calcium, vitamin D, weightbearing exercise recommended.  Follow-up 1 year for yearly exam or as needed.    Subjective   Patient ID: Orly Reed is a 68 y.o. female.    Vitals:    24 0929   BP: 118/78     Was seen today for yearly exam with ultrasound.  Please see assessment plan and ultrasound note for details.      The following portions of the patient's history were reviewed and updated as appropriate: allergies, current medications, past family history, past medical history, past social history, past surgical history, and problem list.  Past Medical History:   Diagnosis Date    Cancer (HCC) 2016    Hairy Cell Leukemia    Endometriosis     Female infertility     Fibrocystic breast     Fibroid     History of chemotherapy 2016    Hairy cell leukemia    Hypertension borderline high ~2014: PCP prescribed Losartan 100mg/day    Recurrent pregnancy loss, antepartum condition or complication     Skin cancer      Past Surgical History:   Procedure Laterality Date    BREAST BIOPSY Left 10/09/2020    2:00 7cmfn    ENDOMETRIAL ABLATION      Re: Fertility Treatment    LAPAROSCOPY      US GUIDED BREAST BIOPSY LEFT COMPLETE Left 10/09/2020     OB History    Para Term  AB Living   1   0   1     SAB IAB Ectopic Multiple Live Births   1       0      # Outcome Date GA Lbr Marc/2nd Weight Sex Delivery Anes PTL Lv   1 SAB                Current Outpatient Medications:     Azelastine HCl (Astepro) 0.15 % SOLN, into each nostril, Disp: , Rfl:     Cholecalciferol 1.25 MG (71868 UT) capsule, Take by mouth, Disp: , Rfl:     ergocalciferol (VITAMIN D2) 50,000 units, , Disp: , Rfl:     escitalopram (LEXAPRO) 10 mg tablet, Take by mouth, Disp: , Rfl:     fluticasone (FLONASE) 50 mcg/act nasal spray, 2 sprays into each nostril, Disp: , Rfl:     ketoconazole (NIZORAL) 2 % cream, Apply topically  2 (two) times a day as needed, Disp: , Rfl:     losartan (COZAAR) 100 MG tablet, Take 100 mg by mouth daily, Disp: , Rfl:     Calcium Citrate-Vitamin D 315-250 MG-UNIT TABS, , Disp: , Rfl:     cyclobenzaprine (FLEXERIL) 10 mg tablet, cyclobenzaprine 10 mg tablet  TAKE 1 TABLET BY MOUTH THREE TIMES A DAY AS NEEDED, Disp: , Rfl:     Current Facility-Administered Medications:     dexamethasone (DECADRON) injection 4 mg, 4 mg, Iontophoresis, Daily, James R Lachman, MD  Allergies   Allergen Reactions    Methylisothiazolinone Itching and Rash    Iodinated Contrast Media      Social History     Socioeconomic History    Marital status: /Civil Union     Spouse name: None    Number of children: None    Years of education: None    Highest education level: None   Occupational History    None   Tobacco Use    Smoking status: Never    Smokeless tobacco: Never   Vaping Use    Vaping status: Never Used   Substance and Sexual Activity    Alcohol use: Yes    Drug use: No    Sexual activity: Yes     Partners: Male     Birth control/protection: None     Comment: Dr. Leung removed my ovarian cyst(s) in ~2010   Other Topics Concern    None   Social History Narrative    None     Social Determinants of Health     Financial Resource Strain: Low Risk  (7/13/2023)    Received from [facility]    Overall Financial Resource Strain (CARDIA)     Difficulty of Paying Living Expenses: Not hard at all   Food Insecurity: No Food Insecurity (7/13/2023)    Received from [facility]    Hunger Vital Sign     Worried About Running Out of Food in the Last Year: Never true     Ran Out of Food in the Last Year: Never true   Transportation Needs: No Transportation Needs (7/13/2023)    Received from [facility]    PRAPARE - Transportation     Lack of Transportation (Medical): No     Lack of Transportation (Non-Medical): No   Physical Activity: Not on file   Stress: No Stress Concern Present (7/13/2023)     Received from [facility]    [place] [employer] - Occupational Stress Questionnaire     Feeling of Stress : Only a little   Social Connections: Moderately Isolated (7/13/2023)    Received from [facility]    Social Connection and Isolation Panel [NHANES]     Frequency of Communication with Friends and Family: Once a week     Frequency of Social Gatherings with Friends and Family: Never     Attends Nondenominational Services: Never     Active Member of Clubs or Organizations: Yes     Attends Club or Organization Meetings: 1 to 4 times per year     Marital Status:    Intimate Partner Violence: Not At Risk (7/13/2023)    Received from [facility]    Humiliation, Afraid, Rape, and Kick questionnaire     Fear of Current or Ex-Partner: No     Emotionally Abused: No     Physically Abused: No     Sexually Abused: No   Housing Stability: Low Risk  (7/13/2023)    Received from [facility]    Housing Stability Vital Sign     Unable to Pay for Housing in the Last Year: No     Number of Places Lived in the Last Year: 1     Unstable Housing in the Last Year: No     Family History   Problem Relation Age of Onset    Breast cancer Mother 75        reoccurrence on other breast 80 years old    Heart failure Mother         Congestive Hear Failure    Coronary artery disease Father     Hypertension Father     Heart disease Father         triple bypass 1993, age 72    Seizures Father         due to coronary artery disease    Stroke Father         Fatal    No Known Problems Sister     No Known Problems Maternal Grandmother     Stomach cancer Maternal Grandfather     No Known Problems Paternal Grandmother     No Known Problems Paternal Grandfather     No Known Problems Paternal Aunt     No Known Problems Paternal Aunt        Review of Systems   Constitutional:  Negative for chills, diaphoresis, fatigue and fever.   Respiratory:  Negative for apnea,  "cough, chest tightness, shortness of breath and wheezing.    Cardiovascular:  Negative for chest pain, palpitations and leg swelling.   Gastrointestinal:  Negative for abdominal distention, abdominal pain, anal bleeding, constipation, diarrhea, nausea, rectal pain and vomiting.   Genitourinary:  Negative for difficulty urinating, dyspareunia, dysuria, frequency, hematuria, menstrual problem, pelvic pain, urgency, vaginal bleeding, vaginal discharge and vaginal pain.   Musculoskeletal:  Negative for arthralgias, back pain and myalgias.   Skin:  Negative for color change and rash.   Neurological:  Negative for dizziness, syncope, light-headedness, numbness and headaches.   Hematological:  Negative for adenopathy. Does not bruise/bleed easily.   Psychiatric/Behavioral:  Negative for dysphoric mood and sleep disturbance. The patient is not nervous/anxious.        Objective   Physical Exam  OBGyn Exam     Objective      /78 (BP Location: Left arm, Patient Position: Sitting, Cuff Size: Standard)   Ht 5' 2\" (1.575 m)   BMI 25.79 kg/m²     General:   alert and oriented, in no acute distress   Neck: normal to inspection and palpation   Breast: normal appearance, no masses or tenderness   Heart:    Lungs:    Abdomen: soft, non-tender, without masses or organomegaly   Vulva: normal   Vagina: Mildly atrophic, without erythema or lesions or discharge   Cervix: Mildly atrophic, without lesions or discharge or cervicitis.  No CMT   Uterus: top normal size, mid-position, non-tender   Adnexa: no mass, fullness, tenderness   Rectum: negative    Psych:  Normal mood and affect   Skin:  Without obvious lesions   Eyes: symmetric, with normal movements and reactivity   Musculoskeletal:  Normal muscle tone and movements appreciated       "

## 2024-04-26 ENCOUNTER — TELEPHONE (OUTPATIENT)
Age: 69
End: 2024-04-26

## 2024-04-26 NOTE — TELEPHONE ENCOUNTER
Pt called in and stated that she pulled a muscle on her groin area and causing a bit of pelvic pain. Pt is asking Dr. Leung if she should see a physical therapist or another provider, or se should make an appt to see Dr. Leung. Please follow up with pt.

## 2024-04-26 NOTE — TELEPHONE ENCOUNTER
Pt returned call. Reviewed recommendations from Dr. Leung and she verbalized understanding. States she has tried many of those things, pain will go away for a little but returns again with exercise. Pt would like referral for physical therapy.

## 2024-04-26 NOTE — TELEPHONE ENCOUNTER
Would recommend warm soaks, heating pad, or ice packs as needed.  Would recommend ibuprofen and avoidance of exacerbating activities.  If not improved in the near future, would recommend exam to evaluate and make recommendations accordingly.  Physical therapy for women program is an option as well.

## 2024-04-29 DIAGNOSIS — R10.2 PELVIC PAIN IN FEMALE: Primary | ICD-10-CM

## 2024-05-16 ENCOUNTER — EVALUATION (OUTPATIENT)
Dept: PHYSICAL THERAPY | Facility: CLINIC | Age: 69
End: 2024-05-16
Payer: MEDICARE

## 2024-05-16 DIAGNOSIS — R10.2 PELVIC PAIN IN FEMALE: ICD-10-CM

## 2024-05-16 DIAGNOSIS — R10.2 PELVIC PAIN: Primary | ICD-10-CM

## 2024-05-16 PROCEDURE — 97162 PT EVAL MOD COMPLEX 30 MIN: CPT | Performed by: PHYSICAL THERAPIST

## 2024-05-16 PROCEDURE — 97112 NEUROMUSCULAR REEDUCATION: CPT | Performed by: PHYSICAL THERAPIST

## 2024-05-16 NOTE — PROGRESS NOTES
PT Evaluation     Today's date: 2024  Patient name: Orly Reed  : 1955  MRN: [id number]  Referring provider: Jose Leung MD  Dx:   Encounter Diagnosis     ICD-10-CM    1. Pelvic pain  R10.2                      Assessment  Impairments: activity intolerance, lacks appropriate home exercise program, pain with function, participation limitations and activity limitations  Symptom irritability: moderate  Understanding of Dx/Px/POC: excellent     Prognosis: excellent    Goals  (Up to 8 weeks)  1. Independent with PF HEP.  2. Independent and safe with body mechanics and PFM activation to min KATE.  3. Independent and safe with self-postural correction and home use of a squatty potty  4. Independent and safe with abdominal breathing with therex/HEP.    Plan  Patient would benefit from: skilled physical therapy    Planned therapy interventions: manual therapy, neuromuscular re-education, patient/caregiver education, postural training, strengthening, stretching, therapeutic activities, therapeutic exercise, therapeutic training, home exercise program, graded exercise, flexibility, breathing training, body mechanics training, activity modification and abdominal trunk stabilization    Frequency: 1x week  Duration in weeks: 8  Treatment plan discussed with: patient        PT Pelvic Floor Subjective:   History of Present Illness:   Pt is 69 y/o female with c/o pelvic pain and abdominal discomfort after exercising in a gym (prolong amb on T'dmill, outdoor amb on various surfaces). Symptoms started about 2 months. Pt was referred by her OBGYN office for PF evaluation and tx.  Current functional limitations: (+) pelvic pain and pressure, coccyx discomfort, lower abdominal discomfort, (+) pain with intimacy on/off, some slight KATE on/off.Date of onset: 3/16/2024          Not a recurrent problem   Quality of life: fair    Social Support:     Lives in:  Multiple-level home    Lives with:  Spouse    Relationship  "status: /committed    Work status: retired    Life stress level: 8 (due to symptoms)    Life stress severity: severe    History of Depression: noPronouns: she/her  Hand dominance:  Right  Diet and Exercise:    Diet:balanced nutrition    Exercise type: walking, strengthening exercise program and combination activity    Exercise frequency: 2-3 times per week    Not exercising due to: pain  OB/ gyn History    Gestational History:     Prior Pregnancy: Yes      Number of prior pregnancies: 1    Menstrual History:      Menopausal: menopause  no hormone replacement therapy  1992 endometriosis, Hx of:  tilted uterus, (+) ovarian cysts in 2010, (+) fibroids - stable  Bladder Function:     Voiding Difficulties negative for: urgency, frequent urination, straining and incomplete emptying       Voiding Difficulties comments:     Voiding frequency: every 1-2 hours    Urinary leakage: urine leakage    Urinary leakage aggravated by: coughing, sneezing and laughing    Urinary leakage not aggravated by: exercise, standing up, walking to the bathroom and post-void dribble    Nocturia (episodes per night): 0    Painful urination: No      Fluid Intake Type:  Water and coffee    Intake (ounces): Water intake (oz): 12 oz x 3 per day. Coffee intake (oz): 2-3 cups.   Incontinence Management:     Pads/Diaper Use:  None  Bowel Function:     Bowel Function comments:  Educated on home use of a squatty potty.    Bowel frequency: daily and multiple times a day    [place] Stool Scale: type 4    Stool softener use: no stool softeners    Enema use: no enema    Uses \"squatty potty\": no Squatty Potty  Sexual Function:     Sexually Active:  Sexually active    Pain during intercourse: Yes      Lubrication Use: YesSexual function: vaginal pain  Pain:     No pain reported by patient.  Diagnostic Tests:     Ultrasound: normal    Colonoscopy: abnormal  Treatments:     None    Patient Goals:     Patient goals for therapy:  Decreased pain, improved " quality of life, return to sport/leisure activities, improved pain management and improved comfort      Objective     Static Posture     Comments  Objective part of IE: TBA next session             Precautions: not any given      Manuals 5/16            Objective part of IE next            SUSAN next            PF MMT next                         Neuro Re-Ed                          WH PT/anatomy/edu/purpose/benefits 5'            Squatty potty use/posture/benefits/purpose 5'                                                                Ther Ex                                                                                                                     Ther Activity                                       Gait Training                                       Modalities                                             sphincter: wink reflex intact    pelvic floor exam consent given by patient    Pelvic exam completed: vaginally              Precautions: not any given      Manuals 5/16            Objective part of IE next            SUSAN next            PF MMT next                         Neuro Re-Ed                           PT/anatomy/edu/purpose/benefits 5'            Squatty potty use/posture/benefits/purpose 5'                                                                Ther Ex                                                                                                                     Ther Activity                                       Gait Training                                       Modalities

## 2024-05-21 ENCOUNTER — OFFICE VISIT (OUTPATIENT)
Dept: PHYSICAL THERAPY | Facility: CLINIC | Age: 69
End: 2024-05-21
Payer: MEDICARE

## 2024-05-21 DIAGNOSIS — R10.2 PELVIC PAIN: Primary | ICD-10-CM

## 2024-05-21 DIAGNOSIS — R10.2 PELVIC PAIN IN FEMALE: ICD-10-CM

## 2024-05-21 PROCEDURE — 97140 MANUAL THERAPY 1/> REGIONS: CPT | Performed by: PHYSICAL THERAPIST

## 2024-05-21 PROCEDURE — 97110 THERAPEUTIC EXERCISES: CPT | Performed by: PHYSICAL THERAPIST

## 2024-05-21 PROCEDURE — 97530 THERAPEUTIC ACTIVITIES: CPT | Performed by: PHYSICAL THERAPIST

## 2024-05-21 NOTE — PROGRESS NOTES
"Daily Note     Today's date: 2024  Patient name: Orly Reed  : 1955  MRN: [id number]  Referring provider: Jose Leung MD  Dx:   Encounter Diagnosis     ICD-10-CM    1. Pelvic pain  R10.2       2. Pelvic pain in female  R10.2                      Subjective: No new changes since IE.      Objective: See treatment diary below      Assessment: Tolerated treatment well. Patient demonstrated fatigue post treatment and would benefit from continued PT for further strengthening with gradual progression from supine to sit/stand as yohana. HEP given and reviewed. VC's/TC's needed for correct ex tech.      Plan: Continue per plan of care.  Progress treatment as tolerated.       Precautions: not any given      Manuals            Objective part of IE next SZ           SUSAN next none           PF MMT next 3/5 w decreased prolong hold endurance           Prolapse assessment  (+) min/ant           Neuro Re-Ed                          WH PT/anatomy/edu/purpose/benefits 5'            Squatty potty use/posture/benefits/purpose 5'                                                                Ther Ex             Supine abdominal breathing  10x           Supine PF/TA   10x5\"           Supine PF/TA/AD's t-ball use  10x5\"           Supine PF/TA/TB TB use  10x5\"           Butterfly stretch  5x30\" ea                                                                                         Ther Activity             HEP edu/review  8' given                        Gait Training                                       Modalities                                            "

## 2024-05-23 ENCOUNTER — OFFICE VISIT (OUTPATIENT)
Dept: PHYSICAL THERAPY | Facility: CLINIC | Age: 69
End: 2024-05-23
Payer: MEDICARE

## 2024-05-23 DIAGNOSIS — R10.2 PELVIC PAIN: Primary | ICD-10-CM

## 2024-05-23 DIAGNOSIS — R10.2 PELVIC PAIN IN FEMALE: ICD-10-CM

## 2024-05-23 PROCEDURE — 97112 NEUROMUSCULAR REEDUCATION: CPT | Performed by: PHYSICAL THERAPIST

## 2024-05-23 PROCEDURE — 97530 THERAPEUTIC ACTIVITIES: CPT | Performed by: PHYSICAL THERAPIST

## 2024-05-23 PROCEDURE — 97110 THERAPEUTIC EXERCISES: CPT | Performed by: PHYSICAL THERAPIST

## 2024-05-23 NOTE — PROGRESS NOTES
"Daily Note     Today's date: 2024  Patient name: Orly Reed  : 1955  MRN: [id number]  Referring provider: Jose Leung MD  Dx:   Encounter Diagnosis     ICD-10-CM    1. Pelvic pain  R10.2       2. Pelvic pain in female  R10.2                      Subjective: Pt reports performing daily HEP.      Objective: See treatment diary below      Assessment: Tolerated treatment well. Patient exhibited good technique with therapeutic exercises and would benefit from continued PT for further PF therex and HEP update. No pain post tx verbalized. HEP was updated and reviewed. Pt is very motivated and compliant.      Plan: Continue per plan of care.  Progress treatment as tolerated.       Precautions: not any given      Manuals           Objective part of IE next SZ           SUSAN next none           PF MMT next 3/5 w decreased prolong hold endurance           Prolapse assessment  (+) min/ant           Neuro Re-Ed                          WH PT/anatomy/edu/purpose/benefits 5'            Squatty potty use/posture/benefits/purpose 5'                         Bike/posture/PF   L2 10'                                    Ther Ex             Supine abdominal breathing  10x 5x review          Supine PF/TA   10x5\" 5x5\" review          Supine PF/TA/AD's t-ball use  10x5\" 5x5\" review          Supine PF/TA/TB TB use  10x5\" 5x5\" review          Butterfly stretch  5x30\" ea 3x30\" review          S/l clam shells/PF/TB   10x5\" ea side VC's          Quadruped PF/TA   10x5\"           Child pose stretch   3x30\"                                                 Ther Activity             HEP edu/review  8' given 8' updated                       Gait Training                                       Modalities                                              "

## 2024-05-28 ENCOUNTER — OFFICE VISIT (OUTPATIENT)
Dept: PHYSICAL THERAPY | Facility: CLINIC | Age: 69
End: 2024-05-28
Payer: MEDICARE

## 2024-05-28 DIAGNOSIS — R10.2 PELVIC PAIN IN FEMALE: ICD-10-CM

## 2024-05-28 DIAGNOSIS — R10.2 PELVIC PAIN: Primary | ICD-10-CM

## 2024-05-28 PROCEDURE — 97112 NEUROMUSCULAR REEDUCATION: CPT | Performed by: PHYSICAL THERAPIST

## 2024-05-28 PROCEDURE — 97110 THERAPEUTIC EXERCISES: CPT | Performed by: PHYSICAL THERAPIST

## 2024-05-28 PROCEDURE — 97530 THERAPEUTIC ACTIVITIES: CPT | Performed by: PHYSICAL THERAPIST

## 2024-05-28 NOTE — PROGRESS NOTES
"Daily Note     Today's date: 2024  Patient name: Orly Reed  : 1955  MRN: [id number]  Referring provider: Jose Leung MD  Dx:   Encounter Diagnosis     ICD-10-CM    1. Pelvic pain  R10.2       2. Pelvic pain in female  R10.2                      Subjective: Pt reports feeling better, less discomfort. Pt would like to start gym exercises with PFM activation.       Objective: See treatment diary below      Assessment: Tolerated treatment well. Patient exhibited good technique with therapeutic exercises and would benefit from continued PT for further PF therex and HEP update. Today's session focused on gym ex with PFM activation and correct abdominal breathing tech.      Plan: Continue per plan of care.  Progress treatment as tolerated.       Precautions: not any given      Manuals          Objective part of IE next SZ           SUSAN next none           PF MMT next 3/ w decreased prolong hold endurance           Prolapse assessment  (+) min/ant           Neuro Re-Ed                          WH PT/anatomy/edu/purpose/benefits 5'            Squatty potty use/posture/benefits/purpose 5'                         Bike/posture/PF   L2 10' Rec bike L3 10'                                   Ther Ex             Supine abdominal breathing  10x 5x review          Supine PF/TA   10x5\" 5x5\" review          Supine PF/TA/AD's t-ball use  10x5\" 5x5\" review          Supine PF/TA/TB TB use  10x5\" 5x5\" review          Butterfly stretch  5x30\" ea 3x30\" review          S/l clam shells/PF/TB   10x5\" ea side VC's          Quadruped PF/TA   10x5\"           Child pose stretch   3x30\"          Leg press/PF/TA    L3 10x2         Bonnie mid rows/PF/TA    15 lbs 10x2         Bonnie biceps curls/PF/TA    15 lbs 10x2         T'dmill amb/posture/PF/TA    2.5 mph 5' and 2% elev.         Step ups/PF/TA    6\" 10x5\" ea LE                                                Ther Activity             HEP edu/review  8' given " 8' updated 8' updated                      Gait Training                                       Modalities

## 2024-06-04 ENCOUNTER — OFFICE VISIT (OUTPATIENT)
Dept: PHYSICAL THERAPY | Facility: CLINIC | Age: 69
End: 2024-06-04
Payer: MEDICARE

## 2024-06-04 DIAGNOSIS — R10.2 PELVIC PAIN: Primary | ICD-10-CM

## 2024-06-04 DIAGNOSIS — R10.2 PELVIC PAIN IN FEMALE: ICD-10-CM

## 2024-06-04 PROCEDURE — 97110 THERAPEUTIC EXERCISES: CPT | Performed by: PHYSICAL THERAPIST

## 2024-06-04 PROCEDURE — 97112 NEUROMUSCULAR REEDUCATION: CPT | Performed by: PHYSICAL THERAPIST

## 2024-06-04 NOTE — PROGRESS NOTES
"Daily Note     Today's date: 2024  Patient name: Orly Reed  : 1955  MRN: [id number]  Referring provider: Jose Leung MD  Dx:   Encounter Diagnosis     ICD-10-CM    1. Pelvic pain  R10.2       2. Pelvic pain in female  R10.2                      Subjective: No pain at thi moment.      Objective: See treatment diary below      Assessment: Tolerated treatment well. Patient exhibited good technique with therapeutic exercises and would benefit from continued PT for further PFM strengthening in various positions. HEP was updated and reviewed. No pain post tx.      Plan: Continue per plan of care.  Progress treatment as tolerated.       Precautions: not any given      Manuals         Objective part of IE next SZ           SUSAN next none           PF MMT next 3/5 w decreased prolong hold endurance           Prolapse assessment  (+) min/ant           Neuro Re-Ed                          WH PT/anatomy/edu/purpose/benefits 5'            Squatty potty use/posture/benefits/purpose 5'                         Bike/posture/PF   L2 10' Rec bike L3 10'         T'dmill amb/posture/PF     3.0 mph 10' 2% elev.                     Ther Ex             Supine abdominal breathing  10x 5x review          Supine PF/TA   10x5\" 5x5\" review          Supine PF/TA/AD's t-ball use  10x5\" 5x5\" review          Supine PF/TA/TB TB use  10x5\" 5x5\" review          Butterfly stretch  5x30\" ea 3x30\" review          S/l clam shells/PF/TB   10x5\" ea side VC's          Quadruped PF/TA   10x5\"           Child pose stretch   3x30\"          Leg press/PF/TA    L3 10x2 L4 10x2        [place] mid rows/PF/TA    15 lbs 10x2 16 lbs 10x2        Bonnie biceps curls/PF/TA    15 lbs 10x2 16 lbs 10x2        T'dmill amb/posture/PF/TA    2.5 mph 5' and 2% elev.         Step ups/PF/TA    6\" 10x5\" ea LE 8\" 10x5\" ea LE        Foam mini squats t-ball trampoline toss/PF     #4 10x2        Foam u/l balance t-ball trampoline toss/PF     #4 " 10x2        FF lunges/PF     #4 10x ea LE                                               Ther Activity             HEP edu/review  8' given 8' updated 8' updated                      Gait Training                                       Modalities

## 2024-07-02 ENCOUNTER — OFFICE VISIT (OUTPATIENT)
Dept: PHYSICAL THERAPY | Facility: CLINIC | Age: 69
End: 2024-07-02
Payer: MEDICARE

## 2024-07-02 DIAGNOSIS — R10.2 PELVIC PAIN: Primary | ICD-10-CM

## 2024-07-02 DIAGNOSIS — R10.2 PELVIC PAIN IN FEMALE: ICD-10-CM

## 2024-07-02 PROCEDURE — 97112 NEUROMUSCULAR REEDUCATION: CPT | Performed by: PHYSICAL THERAPIST

## 2024-07-02 PROCEDURE — 97530 THERAPEUTIC ACTIVITIES: CPT | Performed by: PHYSICAL THERAPIST

## 2024-07-02 PROCEDURE — 97110 THERAPEUTIC EXERCISES: CPT | Performed by: PHYSICAL THERAPIST

## 2024-07-02 NOTE — PROGRESS NOTES
"Daily Note     Today's date: 2024  Patient name: Orly Reed  : 1955  MRN: [id number]  Referring provider: Jose Leung MD  Dx:   Encounter Diagnosis     ICD-10-CM    1. Pelvic pain  R10.2       2. Pelvic pain in female  R10.2                      Subjective: Pt reports increase in LBP and pelvic pain pos prolong sitting while on vacation.      Objective: See treatment diary below      Assessment: Tolerated treatment well. Patient was educated on importance of L-roll use for sitting ADL's, car rides, plane rides, self - postural correction. No pain verbalized post tx. Pt is very motivated and continue to work on various gym ex with focus on PF activation and postural awareness.      Plan: Continue PT as per POC.     Precautions: not any given      Manuals        Objective part of IE next SZ    next       SUSAN next none           PF MMT next 3/5 w decreased prolong hold endurance           Prolapse assessment  (+) min/ant           Neuro Re-Ed                          WH PT/anatomy/edu/purpose/benefits 5'            Squatty potty use/posture/benefits/purpose 5'                         Bike/posture/PF   L2 10' Rec bike L3 10'  L-roll use, L2 10'       T'dmill amb/posture/PF     3.0 mph 10' 2% elev.                     Ther Ex             Supine abdominal breathing  10x 5x review          Supine PF/TA   10x5\" 5x5\" review          Supine PF/TA/AD's t-ball use  10x5\" 5x5\" review          Supine PF/TA/TB TB use  10x5\" 5x5\" review          Butterfly stretch  5x30\" ea 3x30\" review          S/l clam shells/PF/TB   10x5\" ea side VC's          Quadruped PF/TA   10x5\"           Child pose stretch   3x30\"          Leg press/PF/TA    L3 10x2 L4 10x2 L3 10x2, 5\" B LE, u/l 10x5\"       [place] low row/PF/TA w u/l balance      12 lbs 10x5\" ea E       [place] mid rows/PF/TA    15 lbs 10x2 16 lbs 10x2        Bonnie biceps curls/PF/TA    15 lbs 10x2 16 lbs 10x2        T'dmill amb/posture/PF/TA    " "2.5 mph 5' and 2% elev.         Step ups/PF/TA    6\" 10x5\" ea LE 8\" 10x5\" ea LE        Foam mini squats t-ball trampoline toss/PF     #4 10x2        Foam u/l balance t-ball trampoline toss/PF     #4 10x2        FF lunges/PF     #4 10x ea LE        Sit to stand w PF/TA      #6 10x5\"                                 Ther Activity             HEP edu/review  8' given 8' updated 8' updated  8' update/review       L roll use/postural awarenes/ADL's      2'       Gait Training                                       Modalities                                                    "

## 2024-07-09 ENCOUNTER — OFFICE VISIT (OUTPATIENT)
Dept: PHYSICAL THERAPY | Facility: CLINIC | Age: 69
End: 2024-07-09
Payer: MEDICARE

## 2024-07-09 DIAGNOSIS — R10.2 PELVIC PAIN: Primary | ICD-10-CM

## 2024-07-09 DIAGNOSIS — R10.2 PELVIC PAIN IN FEMALE: ICD-10-CM

## 2024-07-09 PROCEDURE — 97110 THERAPEUTIC EXERCISES: CPT | Performed by: PHYSICAL THERAPIST

## 2024-07-09 PROCEDURE — 97140 MANUAL THERAPY 1/> REGIONS: CPT | Performed by: PHYSICAL THERAPIST

## 2024-07-09 NOTE — PROGRESS NOTES
"Daily Note     Today's date: 2024  Patient name: Orly Reed  : 1955  MRN: [id number]  Referring provider: Jose Leung MD  Dx:   Encounter Diagnosis     ICD-10-CM    1. Pelvic pain  R10.2       2. Pelvic pain in female  R10.2                      Subjective: Pt reports feeling stronger. No discomfort voiced.      Objective: See treatment diary below      Assessment: Tolerated treatment well. Patient exhibited good technique with therapeutic exercises. Pt shows improvement in PF MMT and increase in prolong hold endurance. Pt is very motivated and compliant with HEP.      Plan: Continue per plan of care.  Potential discharge next visit.     Precautions: not any given      Manuals       Objective part of IE next SZ           SUSAN next none           PF MMT next 3/5 w decreased prolong hold endurance     3+/5, some improvement in prolong hold endurance noted      Prolapse assessment  (+) min/ant           Neuro Re-Ed                          WH PT/anatomy/edu/purpose/benefits 5'            Squatty potty use/posture/benefits/purpose 5'                         Bike/posture/PF   L2 10' Rec bike L3 10'  L-roll use, L2 10' L-roll use, L2 10'      T'dmill amb/posture/PF     3.0 mph 10' 2% elev.                     Ther Ex             Supine abdominal breathing  10x 5x review          Supine PF/TA   10x5\" 5x5\" review          Supine PF/TA/AD's t-ball use  10x5\" 5x5\" review          Supine PF/TA/TB TB use  10x5\" 5x5\" review          Butterfly stretch  5x30\" ea 3x30\" review          S/l clam shells/PF/TB   10x5\" ea side VC's          Quadruped PF/TA   10x5\"           Child pose stretch   3x30\"          Leg press/PF/TA    L3 10x2 L4 10x2 L3 10x2, 5\" B LE, u/l 10x5\" L4 10x5\" B LE      Bonnie low row/PF/TA w u/l balance      12 lbs 10x5\" ea  15 lbs 10x5\" ea      Bonnie mid rows/PF/TA    15 lbs 10x2 16 lbs 10x2  15 lbs 10x5\"      [place] biceps curls/PF/TA    15 lbs 10x2 16 lbs 10x2     " "   T'dmill amb/posture/PF/TA    2.5 mph 5' and 2% elev.         Step ups/PF/TA    6\" 10x5\" ea LE 8\" 10x5\" ea LE        Foam mini squats t-ball trampoline toss/PF     #4 10x2        Foam u/l balance t-ball trampoline toss/PF     #4 10x2        FF lunges/PF     #4 10x ea LE        Sit to stand w PF/TA      #6 10x5\" #9 10x5\"                                Ther Activity             HEP edu/review  8' given 8' updated 8' updated  8' update/review       L roll use/postural awarenes/ADL's      2'       Gait Training                                       Modalities                                                      "

## 2024-07-16 ENCOUNTER — APPOINTMENT (OUTPATIENT)
Dept: PHYSICAL THERAPY | Facility: CLINIC | Age: 69
End: 2024-07-16
Payer: MEDICARE

## 2024-07-19 ENCOUNTER — OFFICE VISIT (OUTPATIENT)
Dept: PHYSICAL THERAPY | Facility: CLINIC | Age: 69
End: 2024-07-19
Payer: MEDICARE

## 2024-07-19 DIAGNOSIS — R10.2 PELVIC PAIN IN FEMALE: ICD-10-CM

## 2024-07-19 DIAGNOSIS — R10.2 PELVIC PAIN: Primary | ICD-10-CM

## 2024-07-19 PROCEDURE — 97112 NEUROMUSCULAR REEDUCATION: CPT | Performed by: PHYSICAL THERAPIST

## 2024-07-19 PROCEDURE — 97110 THERAPEUTIC EXERCISES: CPT | Performed by: PHYSICAL THERAPIST

## 2024-07-19 NOTE — PROGRESS NOTES
"Daily Note     Today's date: 2024  Patient name: Orly Reed  : 1955  MRN: [id number]  Referring provider: Jose Leung MD  Dx:   Encounter Diagnosis     ICD-10-CM    1. Pelvic pain  R10.2       2. Pelvic pain in female  R10.2                      Subjective: Pt reports constant improvement in PFM activation with ADL's. Pt would like to focus on gym therex again during today's session. No pain voiced.      Objective: See treatment diary below  (Met all goals)  1. Independent with PF HEP.  2. Independent and safe with body mechanics and PFM activation to min KATE.  3. Independent and safe with self-postural correction and home use of a squatty potty  4. Independent and safe with abdominal breathing with therex/HEP.    Assessment: Tolerated treatment well. Patient exhibited good technique with therapeutic exercises. No discomfort verbalized post tx. Pt is very motivated and compliant with daily HEP. Max potential reached at this time.      Plan: D/C PT at this time with HEP review and gym tech with PFM activation.     Precautions: not any given      Manuals  6//     Objective part of IE next SZ           SUSAN next none           PF MMT next 3/ w decreased prolong hold endurance     3+/5, some improvement in prolong hold endurance noted      Prolapse assessment  (+) min/ant           Neuro Re-Ed                          WH PT/anatomy/edu/purpose/benefits 5'            Squatty potty use/posture/benefits/purpose 5'                         Bike/posture/PF   L2 10' Rec bike L3 10'  L-roll use, L2 10' L-roll use, L2 10' L-roll use 10'     T'dmill amb/posture/PF     3.0 mph 10' 2% elev.                     Ther Ex             Supine abdominal breathing  10x 5x review          Supine PF/TA   10x5\" 5x5\" review          Supine PF/TA/AD's t-ball use  10x5\" 5x5\" review          Supine PF/TA/TB TB use  10x5\" 5x5\" review          Butterfly stretch  5x30\" ea 3x30\" review          S/l " "clam shells/PF/TB   10x5\" ea side VC's          Quadruped PF/TA   10x5\"           Child pose stretch   3x30\"          Leg press/PF/TA    L3 10x2 L4 10x2 L3 10x2, 5\" B LE, u/l 10x5\" L4 10x5\" B LE L4 10x2, 5\" B LE     Bonnie low row/PF/TA w u/l balance      12 lbs 10x5\" ea  15 lbs 10x5\" ea 16 lbs 10x2, 5\"     Bonnie mid rows/PF/TA    15 lbs 10x2 16 lbs 10x2  15 lbs 10x5\"      Bonnie biceps curls/PF/TA    15 lbs 10x2 16 lbs 10x2        T'dmill amb/posture/PF/TA    2.5 mph 5' and 2% elev.    3.0 mph and 2% elev, 10'     Step ups/PF/TA    6\" 10x5\" ea LE 8\" 10x5\" ea LE        Foam mini squats t-ball trampoline toss/PF     #4 10x2        Foam u/l balance t-ball trampoline toss/PF     #4 10x2        FF lunges/PF     #4 10x ea LE        Sit to stand w PF/TA      #6 10x5\" #9 10x5\" #9 10x2, 5\"                               Ther Activity             HEP edu/review  8' given 8' updated 8' updated  8' update/review  8' reviewed     L roll use/postural awarenes/ADL's      2'       Gait Training                                       Modalities                                                        "

## 2024-07-26 ENCOUNTER — HOSPITAL ENCOUNTER (OUTPATIENT)
Dept: ULTRASOUND IMAGING | Facility: CLINIC | Age: 69
End: 2024-07-26
Payer: MEDICARE

## 2024-07-26 DIAGNOSIS — R92.30 DENSE BREASTS: ICD-10-CM

## 2024-07-26 PROCEDURE — 76641 ULTRASOUND BREAST COMPLETE: CPT

## 2025-01-09 ENCOUNTER — HOSPITAL ENCOUNTER (OUTPATIENT)
Dept: MAMMOGRAPHY | Facility: MEDICAL CENTER | Age: 70
Discharge: HOME | End: 2025-01-09
Payer: MEDICARE

## 2025-01-09 VITALS — BODY MASS INDEX: 25.95 KG/M2 | WEIGHT: 141 LBS | HEIGHT: 62 IN

## 2025-01-09 DIAGNOSIS — Z12.31 ENCOUNTER FOR SCREENING MAMMOGRAM FOR MALIGNANT NEOPLASM OF BREAST: ICD-10-CM

## 2025-01-09 PROCEDURE — 77063 BREAST TOMOSYNTHESIS BI: CPT

## 2025-01-09 PROCEDURE — 77067 SCR MAMMO BI INCL CAD: CPT

## 2025-01-13 ENCOUNTER — RESULTS FOLLOW-UP (OUTPATIENT)
Dept: GYNECOLOGY | Facility: CLINIC | Age: 70
End: 2025-01-13

## 2025-01-13 DIAGNOSIS — R92.30 INCONCLUSIVE MAMMOGRAM DUE TO DENSE BREASTS: Primary | ICD-10-CM

## 2025-01-13 DIAGNOSIS — Z12.39 SCREENING FOR BREAST CANCER USING NON-MAMMOGRAM MODALITY: ICD-10-CM

## 2025-01-13 DIAGNOSIS — R92.2 INCONCLUSIVE MAMMOGRAM DUE TO DENSE BREASTS: Primary | ICD-10-CM

## 2025-02-19 NOTE — PROGRESS NOTES
AMB US Pelvic Non OB    Date/Time: 2/20/2025 3:15 PM    Performed by: Emilia Soares  Authorized by: Jose Leung MD  Universal Protocol:  Consent: Verbal consent obtained.  Consent given by: patient  Timeout called at: 2/20/2025 3:15 PM.  Patient understanding: patient states understanding of the procedure being performed  Patient identity confirmed: verbally with patient    Procedure details:     SIS Procedure: No    Technique:  Transvaginal US, Non-OB    Position: lithotomy exam    Uterine findings:     Length (cm): 5.44    Height (cm):  2.73    Width (cm):  2.76    Endometrial stripe: identified      Endometrium thickness (mm):  2.9  Left ovary findings:     Left ovary:  Visualized    Length (cm): 1.7    Height (cm): 0.84    Width (cm): 1.03  Right ovary findings:     Right ovary:  Visualized    Length (cm): 1.92    Height (cm): 0.88    Width (cm): 1.16  Other findings:     Free pelvic fluid: not identified      Free peritoneal fluid: not identified    Post-Procedure Details:     Impression:  Retroverted uterus demonstrates several fibroids. Largest are anterior fundal 1.0cm, lower uterine segment 0.9cm, and anterior intramural 1.0cm; all stable. Bilateral ovaries appear within normal limits. No free fluid.     Tolerance:  Tolerated well, no immediate complications    Complications: no complications    Additional Procedure Comments:      INCIDE F8 E8C-RS transvaginal transducer Serial # [id number] was used to perform the examination today and subsequently followed with high level disinfection utilizing Trophon EPR procedure.     Ultrasound performed at:     [facility]  [address], [place] [address]  Phone:  [phone]  Fax:  [phone]

## 2025-02-20 ENCOUNTER — ULTRASOUND (OUTPATIENT)
Dept: GYNECOLOGY | Facility: CLINIC | Age: 70
End: 2025-02-20
Payer: MEDICARE

## 2025-02-20 ENCOUNTER — ANNUAL EXAM (OUTPATIENT)
Dept: GYNECOLOGY | Facility: CLINIC | Age: 70
End: 2025-02-20
Payer: MEDICARE

## 2025-02-20 VITALS
SYSTOLIC BLOOD PRESSURE: 112 MMHG | BODY MASS INDEX: 27.09 KG/M2 | WEIGHT: 147.2 LBS | HEIGHT: 62 IN | DIASTOLIC BLOOD PRESSURE: 68 MMHG

## 2025-02-20 DIAGNOSIS — M85.89 OSTEOPENIA OF MULTIPLE SITES: ICD-10-CM

## 2025-02-20 DIAGNOSIS — Z12.4 ENCOUNTER FOR PAPANICOLAOU SMEAR FOR CERVICAL CANCER SCREENING: ICD-10-CM

## 2025-02-20 DIAGNOSIS — R92.30 DENSE BREAST: ICD-10-CM

## 2025-02-20 DIAGNOSIS — R10.2 FEMALE PELVIC PAIN: Primary | ICD-10-CM

## 2025-02-20 DIAGNOSIS — D21.9 FIBROIDS: Primary | ICD-10-CM

## 2025-02-20 DIAGNOSIS — Z12.31 ENCOUNTER FOR SCREENING MAMMOGRAM FOR BREAST CANCER: ICD-10-CM

## 2025-02-20 DIAGNOSIS — D25.9 UTERINE LEIOMYOMA, UNSPECIFIED LOCATION: ICD-10-CM

## 2025-02-20 DIAGNOSIS — N95.2 ATROPHY OF VAGINA: ICD-10-CM

## 2025-02-20 PROBLEM — M85.80 OSTEOPENIA: Status: ACTIVE | Noted: 2025-02-20

## 2025-02-20 PROCEDURE — 99214 OFFICE O/P EST MOD 30 MIN: CPT | Performed by: OBSTETRICS & GYNECOLOGY

## 2025-02-20 PROCEDURE — G0145 SCR C/V CYTO,THINLAYER,RESCR: HCPCS | Performed by: OBSTETRICS & GYNECOLOGY

## 2025-02-20 PROCEDURE — 76830 TRANSVAGINAL US NON-OB: CPT | Performed by: OBSTETRICS & GYNECOLOGY

## 2025-02-20 NOTE — PROGRESS NOTES
Assessment & Plan   Diagnoses and all orders for this visit:    Female pelvic pain    Encounter for screening mammogram for breast cancer  -     Mammo screening bilateral w 3d and cad; Future    Atrophy of vagina    Uterine leiomyoma, unspecified location    Dense breast  -     US breast screening bilateral complete (ABUS); Future    Encounter for Papanicolaou smear for cervical cancer screening  -     Liquid-based pap, screening    Osteopenia of multiple sites    1. General-Pap smear done with reflex HPV, self breast awareness reviewed, calcium/vitamin D recommendations discussed, mammogram request given, colonoscopy 8/14/2023 with polyp with 3-year follow-up recommended.  2. intermittent pelvic pain/discomfort-did go to physical therapy for women program last year and this helped significantly.  It ended around July 2024 and she continues with the exercises at home.  She was appreciative of the recommendation and referral.  She will call if any need to return.  3. uterine myomas-again stable on ultrasound today, 1 cm, 0.9 cm, and 1 cm unchanged from previous ultrasounds.  She will call or return with any issues.  4. vaginal atrophy-mild to moderate changes noted on exam.  She does use lubrication with good results.  Vaginal lubrication/moisturizer sheet given, to use as needed.  5. previous history of postmenopausal bleeding-she denies any current complaints.  Status post D&C 2013, endometrial biopsy 2015.  Endometrial lining today is 2.9 and appears normal.  No intervention is recommended.  6.  Osteopenia-DEXA scan from 2/11/2025 with lumbar spine T-score -1.1, femoral neck -1.5.  Major risk 9.7%, hip fracture risk 1.2%, below that for which medical treatment is recommended.  Calcium, vitamin D, and weightbearing exercise recommended.  Recommend repeat DEXA scan February 2027.  7. dense breast changes-noted on most recent mammogram 1/9/2025, Sandraer-Cuzick risk 16.2%.  She does continue with 3D mammogram  alternating with ABUS at about 6-month intervals.  Request for each was given today.  8.  Family history of breast cancer-mother with bilateral breast cancer at ages 65 and 80 respectively.  Genetic testing was not done, urged to consider.  Continue with ABUS and mammogram.  Formation on helix test was given as well to check BRCA1/BRCA2 and she will consider.  9. other-patient with history of fertility drug use in the past.  Given this, she is quite concerned about potential risk to ovaries.  Given this, she is interested in proceeding with yearly exams along with ultrasound to assess adnexa.  Follow-up 1 year for yearly exam and ultrasound or as needed.      Subjective   Patient ID: Orly Reed is a 69 y.o. female.    Vitals:    25 1533   BP: 112/68     Patient was seen today for yearly exam.  Please see assessment plan for details.        The following portions of the patient's history were reviewed and updated as appropriate: allergies, current medications, past family history, past medical history, past social history, past surgical history, and problem list.  Past Medical History:   Diagnosis Date    Cancer (HCC) 2016    Hairy Cell Leukemia    Endometriosis     Female infertility     Fibrocystic breast     Fibroid     History of chemotherapy 2016    Hairy cell leukemia    Hypertension borderline high ~2014: PCP prescribed Losartan 100mg/day    Recurrent pregnancy loss, antepartum condition or complication     Skin cancer      Past Surgical History:   Procedure Laterality Date    BREAST BIOPSY Left 10/09/2020    2:00 7cmfn, benign    ENDOMETRIAL ABLATION      Re: Fertility Treatment    LAPAROSCOPY      US GUIDED BREAST BIOPSY LEFT COMPLETE Left 10/09/2020     OB History    Para Term  AB Living   1  0  1    SAB IAB Ectopic Multiple Live Births   1    0      # Outcome Date GA Lbr Marc/2nd Weight Sex Type Anes PTL Lv   1 SAB                Current Outpatient  Medications:     Azelastine HCl (Astepro) 0.15 % SOLN, into each nostril, Disp: , Rfl:     Calcium Citrate-Vitamin D 315-250 MG-UNIT TABS, , Disp: , Rfl:     Cholecalciferol 1.25 MG (87433 UT) capsule, Take by mouth, Disp: , Rfl:     cyclobenzaprine (FLEXERIL) 10 mg tablet, cyclobenzaprine 10 mg tablet  TAKE 1 TABLET BY MOUTH THREE TIMES A DAY AS NEEDED, Disp: , Rfl:     ergocalciferol (VITAMIN D2) 50,000 units, , Disp: , Rfl:     escitalopram (LEXAPRO) 10 mg tablet, Take by mouth, Disp: , Rfl:     fluticasone (FLONASE) 50 mcg/act nasal spray, 2 sprays into each nostril, Disp: , Rfl:     ketoconazole (NIZORAL) 2 % cream, Apply topically 2 (two) times a day as needed, Disp: , Rfl:     losartan (COZAAR) 100 MG tablet, Take 100 mg by mouth daily, Disp: , Rfl:     Current Facility-Administered Medications:     dexamethasone (DECADRON) injection 4 mg, 4 mg, Iontophoresis, Daily, James R Lachman, MD  Allergies   Allergen Reactions    Methylisothiazolinone Itching and Rash    Iodinated Contrast Media      Social History     Socioeconomic History    Marital status: /Civil Union     Spouse name: None    Number of children: None    Years of education: None    Highest education level: None   Occupational History    None   Tobacco Use    Smoking status: Never    Smokeless tobacco: Never   Vaping Use    Vaping status: Never Used   Substance and Sexual Activity    Alcohol use: Yes    Drug use: No    Sexual activity: Yes     Partners: Male     Birth control/protection: None     Comment: Dr. Leung removed my ovarian cyst(s) in ~2010   Other Topics Concern    None   Social History Narrative    None     Social Drivers of Health     Financial Resource Strain: Low Risk  (7/13/2023)    Received from [facility], [facility]    Overall Financial Resource Strain (CARDIA)     Difficulty of Paying Living Expenses: Not hard at all   Food Insecurity: No Food Insecurity (7/13/2023)    Received from  [facility], [facility]    Hunger Vital Sign     Worried About Running Out of Food in the Last Year: Never true     Ran Out of Food in the Last Year: Never true   Transportation Needs: No Transportation Needs (7/13/2023)    Received from [facility], [facility]    PRAPARE - Transportation     Lack of Transportation (Medical): No     Lack of Transportation (Non-Medical): No   Physical Activity: Not on file   Stress: No Stress Concern Present (7/13/2023)    Received from [facility], [facility]    [place] [employer] - Occupational Stress Questionnaire     Feeling of Stress : Only a little   Social Connections: Moderately Isolated (7/13/2023)    Received from [facility], [facility]    Social Connection and Isolation Panel [NHANES]     Frequency of Communication with Friends and Family: Once a week     Frequency of Social Gatherings with Friends and Family: Never     Attends Shinto Services: Never     Active Member of Clubs or Organizations: Yes     Attends Club or Organization Meetings: 1 to 4 times per year     Marital Status:    Intimate Partner Violence: Not At Risk (7/13/2023)    Received from [facility], [facility]    Humiliation, Afraid, Rape, and Kick questionnaire     Fear of Current or Ex-Partner: No     Emotionally Abused: No     Physically Abused: No     Sexually Abused: No   Housing Stability: Low Risk  (7/13/2023)    Received from [facility], [facility]    Housing Stability Vital Sign     Unable to Pay for Housing in the Last Year: No     Number of Places Lived in the Last Year: 1     Unstable Housing in the Last Year: No     Family History   Problem Relation Age of Onset    Breast cancer Mother 75        reoccurrence on other breast 80 years old    Heart failure  "Mother         Congestive Hear Failure    Breast cancer additional onset Mother         2002 Right Stage 3B; Mastectomy & Chemotherapy    Coronary artery disease Father     Hypertension Father     Heart disease Father         triple bypass 1993, age 72    Seizures Father         due to coronary artery disease    Stroke Father         Fatal    No Known Problems Sister     No Known Problems Maternal Grandmother     Stomach cancer Maternal Grandfather     No Known Problems Paternal Grandmother     No Known Problems Paternal Grandfather     No Known Problems Paternal Aunt     No Known Problems Paternal Aunt        Review of Systems   Constitutional:  Negative for chills, diaphoresis, fatigue and fever.   Respiratory:  Negative for apnea, cough, chest tightness, shortness of breath and wheezing.    Cardiovascular:  Negative for chest pain, palpitations and leg swelling.   Gastrointestinal:  Negative for abdominal distention, abdominal pain, anal bleeding, constipation, diarrhea, nausea, rectal pain and vomiting.   Genitourinary:  Negative for difficulty urinating, dyspareunia, dysuria, frequency, hematuria, menstrual problem, pelvic pain, urgency, vaginal bleeding, vaginal discharge and vaginal pain.   Musculoskeletal:  Negative for arthralgias, back pain and myalgias.   Skin:  Negative for color change and rash.   Neurological:  Negative for dizziness, syncope, light-headedness, numbness and headaches.   Hematological:  Negative for adenopathy. Does not bruise/bleed easily.   Psychiatric/Behavioral:  Negative for dysphoric mood and sleep disturbance. The patient is not nervous/anxious.        Objective   Physical Exam  OBGyn Exam     Objective      /68 (BP Location: Left arm, Patient Position: Sitting, Cuff Size: Standard)   Ht 5' 2\" (1.575 m)   Wt 66.8 kg (147 lb 3.2 oz)   BMI 26.92 kg/m²     General:   alert and oriented, in no acute distress   Neck: normal to inspection and palpation   Breast: normal " appearance, no masses or tenderness   Heart:    Lungs:    Abdomen: soft, non-tender, without masses or organomegaly   Vulva: normal   Vagina: Mild to moderate atrophy, without erythema or lesions.   Cervix: Mild to moderate atrophy, without lesions or cervicitis.  No CMT   Uterus: top normal size, anteverted, non-tender   Adnexa: no mass, fullness, tenderness   Rectum: negative    Psych:  Normal mood and affect   Skin:  Without obvious lesions   Eyes: symmetric, with normal movements and reactivity   Musculoskeletal:  Normal muscle tone and movements appreciated

## 2025-02-27 ENCOUNTER — RESULTS FOLLOW-UP (OUTPATIENT)
Dept: GYNECOLOGY | Facility: CLINIC | Age: 70
End: 2025-02-27

## 2025-02-27 LAB
LAB AP GYN PRIMARY INTERPRETATION: NORMAL
Lab: NORMAL

## 2025-07-28 ENCOUNTER — HOSPITAL ENCOUNTER (OUTPATIENT)
Dept: RADIOLOGY | Age: 70
Discharge: HOME | End: 2025-07-28
Payer: MEDICARE

## 2025-07-28 VITALS — WEIGHT: 144 LBS | HEIGHT: 63 IN | BODY MASS INDEX: 25.52 KG/M2

## 2025-07-28 DIAGNOSIS — R92.30 DENSE BREAST: ICD-10-CM

## 2025-07-28 PROCEDURE — 76641 ULTRASOUND BREAST COMPLETE: CPT
